# Patient Record
Sex: FEMALE | Race: WHITE | Employment: FULL TIME | URBAN - METROPOLITAN AREA
[De-identification: names, ages, dates, MRNs, and addresses within clinical notes are randomized per-mention and may not be internally consistent; named-entity substitution may affect disease eponyms.]

---

## 2017-04-14 ENCOUNTER — HOSPITAL ENCOUNTER (EMERGENCY)
Age: 47
Discharge: STILL ADMITTED OR ADMINISTRATIVE | End: 2017-04-14

## 2017-04-14 VITALS
OXYGEN SATURATION: 98 % | TEMPERATURE: 97.4 F | HEART RATE: 64 BPM | SYSTOLIC BLOOD PRESSURE: 125 MMHG | RESPIRATION RATE: 18 BRPM | DIASTOLIC BLOOD PRESSURE: 95 MMHG

## 2017-04-14 PROCEDURE — 4500000002 HC ER NO CHARGE

## 2019-03-15 ENCOUNTER — APPOINTMENT (OUTPATIENT)
Dept: CT IMAGING | Age: 49
End: 2019-03-15
Payer: COMMERCIAL

## 2019-03-15 ENCOUNTER — HOSPITAL ENCOUNTER (EMERGENCY)
Age: 49
Discharge: HOME | End: 2019-03-15
Payer: COMMERCIAL

## 2019-03-15 VITALS
BODY MASS INDEX: 46.61 KG/M2 | HEIGHT: 64 IN | OXYGEN SATURATION: 98 % | HEART RATE: 88 BPM | DIASTOLIC BLOOD PRESSURE: 81 MMHG | RESPIRATION RATE: 16 BRPM | WEIGHT: 273 LBS | SYSTOLIC BLOOD PRESSURE: 152 MMHG | TEMPERATURE: 98.3 F

## 2019-03-15 DIAGNOSIS — B34.9 VIRAL ILLNESS: Primary | ICD-10-CM

## 2019-03-15 DIAGNOSIS — R19.7 DIARRHEA, UNSPECIFIED TYPE: ICD-10-CM

## 2019-03-15 DIAGNOSIS — F41.1 ANXIETY STATE: ICD-10-CM

## 2019-03-15 LAB
A/G RATIO: 1.2 (ref 1.1–2.2)
ALBUMIN SERPL-MCNC: 4 G/DL (ref 3.4–5)
ALP BLD-CCNC: 96 U/L (ref 40–129)
ALT SERPL-CCNC: 27 U/L (ref 10–40)
ANION GAP SERPL CALCULATED.3IONS-SCNC: 10 MMOL/L (ref 3–16)
AST SERPL-CCNC: 31 U/L (ref 15–37)
BASOPHILS ABSOLUTE: 0 K/UL (ref 0–0.2)
BASOPHILS RELATIVE PERCENT: 0.8 %
BILIRUB SERPL-MCNC: <0.2 MG/DL (ref 0–1)
BILIRUBIN URINE: NEGATIVE
BLOOD, URINE: NEGATIVE
BUN BLDV-MCNC: 12 MG/DL (ref 7–20)
CALCIUM SERPL-MCNC: 9.1 MG/DL (ref 8.3–10.6)
CHLORIDE BLD-SCNC: 100 MMOL/L (ref 99–110)
CLARITY: CLEAR
CO2: 27 MMOL/L (ref 21–32)
COLOR: YELLOW
CREAT SERPL-MCNC: 0.8 MG/DL (ref 0.6–1.1)
EOSINOPHILS ABSOLUTE: 0.2 K/UL (ref 0–0.6)
EOSINOPHILS RELATIVE PERCENT: 4.2 %
GFR AFRICAN AMERICAN: >60
GFR NON-AFRICAN AMERICAN: >60
GLOBULIN: 3.3 G/DL
GLUCOSE BLD-MCNC: 93 MG/DL (ref 70–99)
GLUCOSE URINE: NEGATIVE MG/DL
HCT VFR BLD CALC: 40.8 % (ref 36–48)
HEMOGLOBIN: 13.6 G/DL (ref 12–16)
KETONES, URINE: NEGATIVE MG/DL
LEUKOCYTE ESTERASE, URINE: NEGATIVE
LYMPHOCYTES ABSOLUTE: 2 K/UL (ref 1–5.1)
LYMPHOCYTES RELATIVE PERCENT: 35.5 %
MCH RBC QN AUTO: 28.8 PG (ref 26–34)
MCHC RBC AUTO-ENTMCNC: 33.3 G/DL (ref 31–36)
MCV RBC AUTO: 86.4 FL (ref 80–100)
MICROSCOPIC EXAMINATION: NORMAL
MONOCYTES ABSOLUTE: 0.7 K/UL (ref 0–1.3)
MONOCYTES RELATIVE PERCENT: 12.2 %
NEUTROPHILS ABSOLUTE: 2.7 K/UL (ref 1.7–7.7)
NEUTROPHILS RELATIVE PERCENT: 47.3 %
NITRITE, URINE: NEGATIVE
PDW BLD-RTO: 13.8 % (ref 12.4–15.4)
PH UA: 6 (ref 5–8)
PLATELET # BLD: 255 K/UL (ref 135–450)
PMV BLD AUTO: 7.7 FL (ref 5–10.5)
POTASSIUM SERPL-SCNC: 3.8 MMOL/L (ref 3.5–5.1)
PROTEIN UA: NEGATIVE MG/DL
RBC # BLD: 4.72 M/UL (ref 4–5.2)
SODIUM BLD-SCNC: 137 MMOL/L (ref 136–145)
SPECIFIC GRAVITY UA: <=1.005 (ref 1–1.03)
TOTAL PROTEIN: 7.3 G/DL (ref 6.4–8.2)
URINE TYPE: NORMAL
UROBILINOGEN, URINE: 0.2 E.U./DL
WBC # BLD: 5.6 K/UL (ref 4–11)

## 2019-03-15 PROCEDURE — 2580000003 HC RX 258: Performed by: PHYSICIAN ASSISTANT

## 2019-03-15 PROCEDURE — 96361 HYDRATE IV INFUSION ADD-ON: CPT

## 2019-03-15 PROCEDURE — 96374 THER/PROPH/DIAG INJ IV PUSH: CPT

## 2019-03-15 PROCEDURE — 6360000004 HC RX CONTRAST MEDICATION: Performed by: PHYSICIAN ASSISTANT

## 2019-03-15 PROCEDURE — 81003 URINALYSIS AUTO W/O SCOPE: CPT

## 2019-03-15 PROCEDURE — 85025 COMPLETE CBC W/AUTO DIFF WBC: CPT

## 2019-03-15 PROCEDURE — 6360000002 HC RX W HCPCS: Performed by: PHYSICIAN ASSISTANT

## 2019-03-15 PROCEDURE — 80053 COMPREHEN METABOLIC PANEL: CPT

## 2019-03-15 PROCEDURE — 99284 EMERGENCY DEPT VISIT MOD MDM: CPT

## 2019-03-15 RX ORDER — DIPHENOXYLATE HYDROCHLORIDE AND ATROPINE SULFATE 2.5; .025 MG/1; MG/1
1 TABLET ORAL 4 TIMES DAILY PRN
Qty: 20 TABLET | Refills: 0 | Status: SHIPPED | OUTPATIENT
Start: 2019-03-15 | End: 2019-03-25

## 2019-03-15 RX ORDER — 0.9 % SODIUM CHLORIDE 0.9 %
1000 INTRAVENOUS SOLUTION INTRAVENOUS ONCE
Status: COMPLETED | OUTPATIENT
Start: 2019-03-15 | End: 2019-03-15

## 2019-03-15 RX ORDER — PROMETHAZINE HYDROCHLORIDE 25 MG/1
25 TABLET ORAL EVERY 6 HOURS PRN
Qty: 20 TABLET | Refills: 0 | Status: SHIPPED | OUTPATIENT
Start: 2019-03-15 | End: 2019-03-22

## 2019-03-15 RX ORDER — DICYCLOMINE HYDROCHLORIDE 10 MG/1
10 CAPSULE ORAL
Qty: 30 CAPSULE | Refills: 0 | Status: SHIPPED | OUTPATIENT
Start: 2019-03-15 | End: 2019-08-04

## 2019-03-15 RX ORDER — ONDANSETRON 2 MG/ML
4 INJECTION INTRAMUSCULAR; INTRAVENOUS ONCE
Status: COMPLETED | OUTPATIENT
Start: 2019-03-15 | End: 2019-03-15

## 2019-03-15 RX ADMIN — SODIUM CHLORIDE 1000 ML: 9 INJECTION, SOLUTION INTRAVENOUS at 14:46

## 2019-03-15 RX ADMIN — ONDANSETRON 4 MG: 2 INJECTION INTRAMUSCULAR; INTRAVENOUS at 14:46

## 2019-03-15 ASSESSMENT — PAIN DESCRIPTION - ORIENTATION: ORIENTATION: LOWER

## 2019-03-15 ASSESSMENT — PAIN DESCRIPTION - LOCATION: LOCATION: ABDOMEN

## 2019-03-15 ASSESSMENT — PAIN SCALES - GENERAL: PAINLEVEL_OUTOF10: 10

## 2019-03-18 ENCOUNTER — HOSPITAL ENCOUNTER (OUTPATIENT)
Age: 49
Setting detail: SPECIMEN
Discharge: HOME | End: 2019-03-18
Payer: COMMERCIAL

## 2019-03-18 PROCEDURE — 87328 CRYPTOSPORIDIUM AG IA: CPT

## 2019-03-18 PROCEDURE — 87425 ROTAVIRUS AG IA: CPT

## 2019-03-18 PROCEDURE — 87329 GIARDIA AG IA: CPT

## 2019-03-18 PROCEDURE — 87336 ENTAMOEB HIST DISPR AG IA: CPT

## 2019-03-19 LAB
CRYPTOSPORIDIUM ANTIGEN STOOL: NORMAL
E HISTOLYTICA ANTIGEN STOOL: NORMAL
GIARDIA ANTIGEN STOOL: NORMAL

## 2019-03-21 LAB — ROTAVIRUS ANTIGEN: NEGATIVE

## 2019-04-17 ENCOUNTER — HOSPITAL ENCOUNTER (OUTPATIENT)
Dept: NEUROLOGY | Age: 49
Discharge: HOME | End: 2019-04-17
Payer: COMMERCIAL

## 2019-04-17 PROCEDURE — 95886 MUSC TEST DONE W/N TEST COMP: CPT

## 2019-04-17 PROCEDURE — 95909 NRV CNDJ TST 5-6 STUDIES: CPT

## 2019-04-17 NOTE — PROCEDURES
1.2 52 (n>40) 49 (n>40)   H-Reflex                 Summary of EMG and Nerve Conduction Findings: The above EMG needle exam was within normal limits. Nerve conduction studies were essentially within normal limits. Tibial motor evoked responses are borderline small. Remainder within normal limits. Overall Impression: Essentially normal study, small amplitude tibial motor response can be associated with early tarsal tunnel syndrome. No evidence of an acute radiculopathy or other entrapment neuropathy. Thank you. Electronically signed by:  Casandra Ness DO,4/17/2019,7:54 AM

## 2019-08-04 ENCOUNTER — HOSPITAL ENCOUNTER (EMERGENCY)
Age: 49
Discharge: HOME | End: 2019-08-04

## 2019-08-04 ENCOUNTER — APPOINTMENT (OUTPATIENT)
Dept: GENERAL RADIOLOGY | Age: 49
End: 2019-08-04

## 2019-08-04 VITALS
TEMPERATURE: 97.5 F | BODY MASS INDEX: 45.24 KG/M2 | OXYGEN SATURATION: 98 % | DIASTOLIC BLOOD PRESSURE: 85 MMHG | HEART RATE: 62 BPM | RESPIRATION RATE: 18 BRPM | WEIGHT: 265 LBS | SYSTOLIC BLOOD PRESSURE: 174 MMHG | HEIGHT: 64 IN

## 2019-08-04 DIAGNOSIS — S61.211A LACERATION OF LEFT INDEX FINGER WITHOUT FOREIGN BODY WITHOUT DAMAGE TO NAIL, INITIAL ENCOUNTER: Primary | ICD-10-CM

## 2019-08-04 PROCEDURE — 99282 EMERGENCY DEPT VISIT SF MDM: CPT

## 2019-08-04 PROCEDURE — 73140 X-RAY EXAM OF FINGER(S): CPT

## 2019-08-04 PROCEDURE — 6370000000 HC RX 637 (ALT 250 FOR IP): Performed by: NURSE PRACTITIONER

## 2019-08-04 RX ORDER — NAPROXEN 500 MG/1
500 TABLET ORAL ONCE
Status: COMPLETED | OUTPATIENT
Start: 2019-08-04 | End: 2019-08-04

## 2019-08-04 RX ADMIN — NAPROXEN 500 MG: 500 TABLET ORAL at 22:42

## 2019-08-04 ASSESSMENT — PAIN SCALES - GENERAL
PAINLEVEL_OUTOF10: 3
PAINLEVEL_OUTOF10: 3

## 2019-08-04 ASSESSMENT — PAIN DESCRIPTION - LOCATION: LOCATION: FINGER (COMMENT WHICH ONE)

## 2019-08-04 ASSESSMENT — PAIN DESCRIPTION - DESCRIPTORS: DESCRIPTORS: THROBBING

## 2019-08-04 ASSESSMENT — PAIN DESCRIPTION - FREQUENCY: FREQUENCY: CONTINUOUS

## 2019-08-05 NOTE — ED PROVIDER NOTES
[address] ED  EMERGENCY DEPARTMENT ENCOUNTER        Pt Name: Cortes Tobin  MRN: [id number]  [place] 1970  Date of evaluation: 8/4/2019  Provider: HERON Avelar - CNP  PCP: Stevo Clark MD    This patient was not seen and evaluated by the attending physician No att. providers found. CHIEF COMPLAINT       Chief Complaint   Patient presents with    Laceration     pt arrives to T3 c/o laceration to left hand 2nd digit, pt states happeed around midnight last night, pt states was at wedding so put butterfly to it and finsihed night, pt states still having throbbing in area, no active bleeding noted       HISTORY OF PRESENT ILLNESS   (Location/Symptom, Timing/Onset, Context/Setting, Quality, Duration, Modifying Factors, Severity)  Note limiting factors. Cotres Tobin is a 52 y.o. female who presents the emergency department with complaints of a laceration to her left index finger. Patient is right-hand dominant. Patient reports that last night around midnight she was at a wedding and using a pocket knife to cut a hole in a piece of fabric. She states that the knife slipped and accidentally lacerated her left index finger. 1 of her family members did bandage her with butterfly sutures. She comes to the emergency department tonight for evaluation of the wound. Her tetanus is up-to-date. She reports the pain 3 out of 10. Nursing Notes were all reviewed and agreed with or any disagreements were addressed  in the HPI. REVIEW OF SYSTEMS    (2-9 systems for level 4, 10 or more for level 5)     Review of Systems   Constitutional: Negative for chills and fever. Musculoskeletal: Positive for arthralgias. Skin: Positive for wound. Neurological: Negative for weakness and numbness. All other systems reviewed and are negative. Positives and Pertinent negatives as per HPI. Except as noted abovein the ROS, all other systems were reviewed and negative.

## 2020-07-23 ENCOUNTER — HOSPITAL ENCOUNTER (OUTPATIENT)
Age: 50
Setting detail: SPECIMEN
Discharge: HOME | End: 2020-07-23
Payer: COMMERCIAL

## 2020-07-23 PROCEDURE — 81371 HLA I & II TYPE VERIFY LR: CPT

## 2021-10-26 ENCOUNTER — TELEPHONE (OUTPATIENT)
Dept: MAMMOGRAPHY | Age: 51
End: 2021-10-26

## 2021-10-26 ENCOUNTER — HOSPITAL ENCOUNTER (OUTPATIENT)
Dept: MAMMOGRAPHY | Age: 51
Discharge: HOME | End: 2021-10-26
Payer: COMMERCIAL

## 2021-10-26 DIAGNOSIS — Z12.39 SCREENING BREAST EXAMINATION: ICD-10-CM

## 2021-10-26 PROCEDURE — 77063 BREAST TOMOSYNTHESIS BI: CPT

## 2021-11-01 ENCOUNTER — HOSPITAL ENCOUNTER (OUTPATIENT)
Dept: MAMMOGRAPHY | Age: 51
Discharge: HOME | End: 2021-11-01
Payer: COMMERCIAL

## 2021-11-01 ENCOUNTER — HOSPITAL ENCOUNTER (OUTPATIENT)
Dept: ULTRASOUND IMAGING | Age: 51
Discharge: HOME | End: 2021-11-01
Payer: COMMERCIAL

## 2021-11-01 DIAGNOSIS — R92.8 ABNORMAL MAMMOGRAM: ICD-10-CM

## 2021-11-01 PROCEDURE — G0279 TOMOSYNTHESIS, MAMMO: HCPCS

## 2021-11-01 PROCEDURE — 76642 ULTRASOUND BREAST LIMITED: CPT

## 2022-08-05 ENCOUNTER — HOSPITAL ENCOUNTER (OUTPATIENT)
Dept: GENERAL RADIOLOGY | Age: 52
Discharge: HOME | End: 2022-08-05
Payer: COMMERCIAL

## 2022-08-05 ENCOUNTER — HOSPITAL ENCOUNTER (OUTPATIENT)
Age: 52
Discharge: HOME | End: 2022-08-05
Payer: COMMERCIAL

## 2022-08-05 DIAGNOSIS — R11.10 RECURRENT VOMITING: ICD-10-CM

## 2022-08-05 PROCEDURE — 74018 RADEX ABDOMEN 1 VIEW: CPT

## 2022-08-09 ENCOUNTER — TELEPHONE (OUTPATIENT)
Dept: BARIATRICS/WEIGHT MGMT | Age: 52
End: 2022-08-09

## 2022-08-09 NOTE — TELEPHONE ENCOUNTER
Pt says she has been signed up for seminar a couple of times, has sent her insurance info a couple of times and hasnt been able to move past sending in her info, says atp she wanted to speak to a manager    We did see that pt signed up for the seminar and that the seminar was sent out on 2 different occasions, but we have no record of pt sending us insurance info back neither time

## 2022-08-11 ENCOUNTER — TELEPHONE (OUTPATIENT)
Dept: BARIATRICS/WEIGHT MGMT | Age: 52
End: 2022-08-11

## 2022-08-11 NOTE — TELEPHONE ENCOUNTER
Seminar Date: may     Presenter: emily    Insurance Type: medben    BWLS Covered:y    Medically Supervised Diet Req:n    Length of time:     Has patient had prev bariatric or gastric surgeries : n    Is patient's BMI lower than 35 :   n  If yes, BMI :    Does patient have dx of diabetes :n    *If previous bariatric or gastric surgeries, please do not schedule until speaking with the provider*

## 2022-09-22 ENCOUNTER — OFFICE VISIT (OUTPATIENT)
Dept: BARIATRICS/WEIGHT MGMT | Age: 52
End: 2022-09-22
Payer: COMMERCIAL

## 2022-09-22 VITALS
RESPIRATION RATE: 18 BRPM | SYSTOLIC BLOOD PRESSURE: 126 MMHG | DIASTOLIC BLOOD PRESSURE: 72 MMHG | WEIGHT: 293 LBS | OXYGEN SATURATION: 98 % | BODY MASS INDEX: 50.02 KG/M2 | HEIGHT: 64 IN | HEART RATE: 83 BPM

## 2022-09-22 DIAGNOSIS — E66.01 MORBID OBESITY WITH BMI OF 50.0-59.9, ADULT (HCC): Primary | ICD-10-CM

## 2022-09-22 DIAGNOSIS — K21.9 CHRONIC GERD: ICD-10-CM

## 2022-09-22 DIAGNOSIS — I10 ESSENTIAL HYPERTENSION: ICD-10-CM

## 2022-09-22 DIAGNOSIS — R73.03 PREDIABETES: ICD-10-CM

## 2022-09-22 DIAGNOSIS — Z01.818 PREOPERATIVE CLEARANCE: ICD-10-CM

## 2022-09-22 PROCEDURE — 99205 OFFICE O/P NEW HI 60 MIN: CPT | Performed by: SURGERY

## 2022-09-22 RX ORDER — CYANOCOBALAMIN (VITAMIN B-12) 500 MCG
LOZENGE ORAL
COMMUNITY

## 2022-09-22 RX ORDER — OMEPRAZOLE 40 MG/1
CAPSULE, DELAYED RELEASE ORAL
COMMUNITY
Start: 2022-08-05

## 2022-09-22 RX ORDER — VALSARTAN 160 MG/1
TABLET ORAL
COMMUNITY
Start: 2022-08-05

## 2022-09-22 NOTE — Clinical Note
Greatly appreciate the referral.  Excellent candidate for weight loss. We will keep you posted on [place] progress.

## 2022-09-22 NOTE — PROGRESS NOTES
Zenobia Spencer is a 46 y.o. female with a date of birth of 1970. Vitals:    09/22/22 0847   BP: 126/72   Pulse: 83   Resp: 18   SpO2: 98%   BMI: Body mass index is 55.65 kg/m². Obesity Classification: Class III    Weight History: Wt Readings from Last 3 Encounters:   09/22/22 (!) 324 lb 3.2 oz (147.1 kg)   08/04/19 265 lb (120.2 kg)   03/15/19 273 lb (123.8 kg)     Patient's lowest adult weight was 168 lbs at age 29. Patient's highest adult weight was 324 lbs at age 46. Patient has participated in the following weight loss programs: [facility], [facility], , LF, [place], [facility], [facility] and [facility]. Patient has participated in meal replacement/liquid diets. Patient has participated in weight loss medications- thrive, lipozene, garcinia cambogia & apple cider vinegar. Patient is not lactose intolerant. Patient does not have Restorationist/cultural food concerns. Patient does have food allergies- Artifical Grape Flavoring / COCONUT. Patient does tolerate artificial sweeteners. 24 hour recall/food frequency chart:  Breakfast: yes. aaple & brown sugar oatmeal  Snack: no.   Lunch: yes. grilled cheese & slade  Snack: no.   Dinner: yes. tacos- beef, tomato, lettuce, cheese, sour cream & onion  Snack: sometimes tomatoes OR grapes  Drinks throughout the day: water / occasional logan mist / coffee w/ [place]awk vanilla creamer / gatorade zero / diet tea  Do you drink alcohol? Yes. - occasional mixed drink, maybe 1x/year    Patient does not meet the criteria for binge eating disorder. Patient does have grazing. Patient does not have night eating. Patient does have a history of emotional eating or eating out of boredom. Surgery  Patient does feel confident in her ability to make these changes. The patient's expectations of post-surgical eating habits - voices understanding. Patient states she does understand the consequences of not complying with post-op food guidelines. Patient states she does understands the long term changes in food intake that will be necessary for all occasions after surgery for the rest of her life. Patient is deemed nutritionally appropriate to proceed. Goals  Weight: at least 175 lb  Health Improvement: BP / decrease meds / HgbA1C    Assessment  Nutritional Needs: RMR=(9.99 x 120) + (6.25 x 163) - (4.92 x 52 y.o.) -161 = 1801 kcal x 1.3 (sedentary activity factor) = 2341 kcal - 1000 (for 2 lb weight loss/week)= 1341 kcal.  *walk sit-up & leg lifts at least 3x/wk    Plan  Plan/Recommendations: Start presurgical guidelines. Goals:   -Eat 4-5 times daily  -Avoid high fat and high sugar foods  -Include protein with all meals and snacks  -Avoid carbonation and caffeine  -Avoid calorie containing beverages  -Increase physical activity as tolerated    PES Statement:  Overweight/Obesity related to lack of exercise, sedentary lifestyle, unhealthy eating habits, and unsuccessful diet attempts as evidenced by BMI. Body mass index is 55.65 kg/m². Will follow up as necessary.     Taj Fan, RD, LD

## 2022-09-22 NOTE — PATIENT INSTRUCTIONS
Patient received dietary handouts and education.        -Plan for Laparoscopic sleeve gastrectomy      Pre-operative work up Ordered:    - F/U in 4 weeks. - Nutrition Labs. - Protein Shake Trial.  - Psych Evaluation.   - Cardiac Clearance. - Pulmonary Clearance. - EGD (endoscopy to check your stomach). - Support Group Attendance. - Obtain letter of medical necessity (PCP Letter). - Quit Smoking,  Alcohol, Caffeine and Carbonated Drinks  - Obtain records for Weight History 2 yrs. - Start Regular Exercise and track your activities. - Start Tracking your food Intake and follow dietary guidelines. - Avoid Pregnancy for 2 yrs from date of surgery. (for female patients in childbearing age)          Patient advised that its their responsibility to follow up for studies, referrals and/or labs ordered today.

## 2022-09-23 NOTE — PROGRESS NOTES
[facility]) Physicians   [employer] [facility] [employer]  Sanam Woodall MD, [facility], [address], [address] [facility] [address] . Phone: [phone]  Fax: [phone]       Chief Complaint   Patient presents with    Bariatric, Initial Visit     NP Desiree Goodman           HPI:    Ferdinand Velez is a very pleasant 46 y.o. obese female ,   Body mass index is 55.65 kg/m². And multiple medical problems who is presenting for weight loss surgery evaluation and consultation by Dr. Isael Parker. Patient has been struggling for several years now with obesity. Patient feels the weight is an obstacle to achieve and perform things in daily living as well risk on health. Tries to diet, and exercise but can't keep the weight off. Patient tried [facility] [employer] [facility], [employer], , LF, [place], [facility], [facility] and [facility]. Patient has participated in meal replacement/liquid diets. Patient has participated in weight loss medications- thrive, lipozene, garcinia cambogia & apple cider vinegar and other regimens, but with no sustainable weight loss. Patient  is very determined to lose weight and be healthy, and is interested in surgical weight loss for future weight loss. .    Otherwise patient denies any nausea, vomiting, fevers, chills, shortness of breath, chest pain, constipation or urinary symptoms.         Obesity related problems Analia Chaney is dealing with:  Patient Active Problem List   Diagnosis    Chest pain    Menorrhagia    Essential hypertension    Preoperative clearance    Chronic GERD    Morbid obesity with BMI of 50.0-59.9, adult ([address]    Prediabetes           Pain Assessment   Denies any abdominal pain     Past Medical History:   Diagnosis Date    Acid reflux     Hypertension     MVP (mitral valve prolapse)      Past Surgical History:   Procedure Laterality Date    ADENOIDECTOMY      CHOLECYSTECTOMY      ENDOMETRIAL ABLATION  11/22/2013    DILATATION AND CURETTAGE HYSTEROSCOPY WITH NOVASURE ABLATION    OVARIAN CYST REMOVAL      TONSILLECTOMY      TUBAL LIGATION       Family History   Problem Relation Age of Onset    Heart Disease Mother     Cancer Mother     Heart Disease Father     Cancer Father     Cancer Sister      Social History     Tobacco Use    Smoking status: Never    Smokeless tobacco: Never   Substance Use Topics    Alcohol use: No         I counseled the patient on the risks of Smoking, ETOH or Drug use, and importance of completely avoiding them, otherwise patient risks surgery cancellation or post operative serious complications if they start using any. Maddi Kenny acknowledged, agreed not to use and wants to proceed. Allergies   Allergen Reactions    Erythromycin Nausea And Vomiting     Vitals:    09/22/22 0847   BP: 126/72   Pulse: 83   Resp: 18   SpO2: 98%   Weight: (!) 324 lb 3.2 oz (147.1 kg)   Height: 5' 4\" (1.626 m)       Body mass index is 55.65 kg/m².       Current Outpatient Medications:     valsartan (DIOVAN) 160 MG tablet, TAKE 1 TABLET BY MOUTH EVERY DAY, Disp: , Rfl:     omeprazole (PRILOSEC) 40 MG delayed release capsule, TAKE 1 CAPSULE BY MOUTH EVERY DAY BEFORE A MEAL, Disp: , Rfl:     Vitamin E 400 units TABS, Take by mouth, Disp: , Rfl:     venlafaxine (EFFEXOR) 37.5 MG tablet, Take 37.5 mg by mouth daily, Disp: , Rfl:     diltiazem (CARDIZEM CD) 120 MG extended release capsule, Take 2 capsules by mouth daily, Disp: 30 capsule, Rfl: 0    loratadine (CLARITIN) 10 MG capsule, Take  by mouth., Disp: , Rfl:       Review of Systems - History obtained from the patient  General ROS: overweight   Psychological ROS: negative  Ophthalmic ROS: negative  Neurological ROS: negative  ENT ROS: negative  Allergy and Immunology ROS: negative  Hematological and Lymphatic ROS: negative  Endocrine ROS: overweight  Breast ROS: negative  Respiratory ROS: negative  Cardiovascular ROS: negative  Gastrointestinal ROS:negative  Genito-Urinary ROS: negative  Musculoskeletal ROS: weight effects on joints  Skin ROS: negative    Physical Exam   Constitutional: Patient is oriented to person, place, and time. Vital signs are normal. Patient  appears well-developed and well-nourished. Patient  is active and cooperative. Non-toxic appearance. No distress. HENT:   Head: Normocephalic and atraumatic. Head is without laceration. Right Ear: External ear normal. No lacerations. No drainage, swelling or tenderness. Left Ear: External ear normal. No lacerations. No drainage, swelling or tenderness. Nose/Mouth/Throat: Patient is wearing mask due to Covid-19 pandemic precautions, following CDC and health authorities guidelines. Eyes: Conjunctivae, EOM and lids are normal. Pupils are equal, round, and reactive to light. Right eye exhibits no discharge. No foreign body present in the right eye. Left eye exhibits no discharge. No foreign body present in the left eye. No scleral icterus. Neck: Trachea normal and normal range of motion. Neck supple. No JVD present. No tracheal tenderness present. Carotid bruit is not present. No rigidity. No tracheal deviation and no edema present. No thyromegaly present. Cardiovascular: Normal rate, regular rhythm, normal heart sounds, intact distal pulses and normal pulses. Pulmonary/Chest: Effort normal and breath sounds normal. No stridor. No respiratory distress. Patient  has no wheezes. Patient has no rales. Patient exhibits no tenderness and no crepitus. Abdominal: Soft. Normal appearance and bowel sounds are normal. Patient exhibits no distension, no abdominal bruit, no ascites and no mass. There is no hepatosplenomegaly. There is no tenderness. There is no rigidity, no rebound, no guarding and no CVA tenderness. No hernia. Hernia confirmed negative in the ventral area. Musculoskeletal: Normal range of motion. Patient exhibits no edema or tenderness.    Lymphadenopathy:        Head (right side): No submental, no submandibular, no preauricular, no posterior auricular and no occipital adenopathy present. Head (left side): No submental, no submandibular, no preauricular, no posterior auricular and no occipital adenopathy present. Patient  has no cervical adenopathy. Right: No supraclavicular adenopathy present. Left: No supraclavicular adenopathy present. Neurological: Patient is alert and oriented to person, place, and time. Patient has normal strength. Coordination and gait normal. GCS eye subscore is 4. GCS verbal subscore is 5. GCS motor subscore is 6. Skin: Skin is warm and dry. No abrasion and no rash noted. Patient  is not diaphoretic. No cyanosis or erythema. Psychiatric: Patient has a normal mood and affect. speech is normal and behavior is normal. Cognition and memory are normal.         Anuj Huff was seen today for bariatric, initial visit. Diagnoses and all orders for this visit:    Morbid obesity with BMI of 50.0-59.9, adult ([facility] [address] [facility]  -     CBC with Auto Differential; Future  -     Comprehensive Metabolic Panel; Future  -     Hemoglobin A1C; Future  -     Iron and TIBC; Future  -     Lipid Panel; Future  -     TSH with Reflex; Future  -     Vitamin A; Future  -     Vitamin B1, Whole Blood; Future  -     Vitamin B12 & Folate; Future  -     Vitamin D 25 Hydroxy; Future  -     Vitamin E; Future  -     Protime-INR; Future  -     Ambulatory referral to Cardiology  -     [facility]    Essential hypertension  -     CBC with Auto Differential; Future  -     Comprehensive Metabolic Panel; Future  -     Hemoglobin A1C; Future  -     Iron and TIBC; Future  -     Lipid Panel; Future  -     TSH with Reflex; Future  -     Vitamin A; Future  -     Vitamin B1, Whole Blood; Future  -     Vitamin B12 & Folate; Future  -     Vitamin D 25 Hydroxy; Future  -     Vitamin E; Future  -     Protime-INR;  Future  -     Ambulatory referral to Cardiology  -     [facility] - Danette Hammonds MD, [facility]    Preoperative clearance  -     CBC with Auto Differential; Future  -     Comprehensive Metabolic Panel; Future  -     Hemoglobin A1C; Future  -     Iron and TIBC; Future  -     Lipid Panel; Future  -     TSH with Reflex; Future  -     Vitamin A; Future  -     Vitamin B1, Whole Blood; Future  -     Vitamin B12 & Folate; Future  -     Vitamin D 25 Hydroxy; Future  -     Vitamin E; Future  -     Protime-INR; Future  -     Ambulatory referral to Cardiology  Dana Palomino MD, [facility]    Chronic GERD  -     CBC with Auto Differential; Future  -     Comprehensive Metabolic Panel; Future  -     Hemoglobin A1C; Future  -     Iron and TIBC; Future  -     Lipid Panel; Future  -     TSH with Reflex; Future  -     Vitamin A; Future  -     Vitamin B1, Whole Blood; Future  -     Vitamin B12 & Folate; Future  -     Vitamin D 25 Hydroxy; Future  -     Vitamin E; Future  -     Protime-INR; Future  -     Ambulatory referral to Cardiology  Dana Palomino MD, [facility]    Prediabetes  -     CBC with Auto Differential; Future  -     Comprehensive Metabolic Panel; Future  -     Hemoglobin A1C; Future  -     Iron and TIBC; Future  -     Lipid Panel; Future  -     TSH with Reflex; Future  -     Vitamin A; Future  -     Vitamin B1, Whole Blood; Future  -     Vitamin B12 & Folate; Future  -     Vitamin D 25 Hydroxy; Future  -     Vitamin E; Future  -     Protime-INR; Future  -     Ambulatory referral to Cardiology  Dana Palomino MD, [facility]          A/P  Hanane Nielsen is a very pleasant 46 y.o. female with Obesity,  Body mass index is 55.65 kg/m². and multiple obesity related co-morbidities. Hanane Nielsen is very motivated to lose weight and being more healthy.      We discussed how her weight affects her overall health including:  Patient Active Problem List   Diagnosis    Chest pain    Menorrhagia Essential hypertension    Preoperative clearance    Chronic GERD    Morbid obesity with BMI of 50.0-59.9, adult ([facility] [address]    Prediabetes          The patient underwent extensive dietary counseling with the registered dietitian. I have reviewed, discussed and agree with the dietary plan. Medical weight loss and different surgical options were discussed in details with patient. Sukhjinder Lin is interested in surgical weight loss for future weight loss. Patient is interested in Laparoscopic Sleeve Gastrectomy, which I believe is an excellent option. I explained to the patient that surgery does carry a risk specially with the coexisting comorbid conditions the patient have. Surgery as well in obese patiens can carry more risk. Risks including but not limited to; Infection, bleeding, gastric leak or obstruction, persistent nausea, vomiting, or reflux, injury to surrounding structures, risks of anesthesia, stricture, delayed gastric emptying, staple line leak, incisional hernia, malnutrition , vitamin deficiency, neurological complications, paralysis, hair loss, and/ or Conversion to Open surgery may be necessary. Failure to lose or maintain weight loss, Gallstones or Kidney Stones, Deep Venous Thrombosis , pulmonary embolism and / or death. However I do believe the benefits outweighs that risk. Wichitasanjay Burden understands the risks and wants to proceed. We will proceed with pre-operative work up labs and studies. Will also petition patient's  insurance for approval for this procedure. I advised the patient that we can't guarantee final insurance approval.    Patient received dietary handouts and education. Patient advised that its their responsibility to follow up for studies, referrals and/or labs ordered today.    Also discussed in details the importance of follow up, as well following the recommendations and completing the whole program to improve outcomes when it comes to healthier lifestyle as well weight loss. Patient also advised about risks and benefits being on a strict dietary regimen as well using supplements. Patient agrees and wants to proceed with weight loss planning     Today's encounter included any number of the following: Bariatric Pre/Post operative work up/protocols, review of labs, imaging, provider notes, outside hospital records, performing examination/evaluation, counseling patient and/or family, ordering medications/tests, placing referrals and communication with referring physicians, coordination of care; discussing dietary plan/recall with the patient as well with registered dietitian and documentation in the EHR. Of note, the above was done during same day of the actual patient encounter. Obesity as a disease is considered a high risk to patients overall health and should therefore be considered a high risk disease state. Advised the patient that not getting there weight under control (weight loss hopefully will help with resolving/improving of the comorbid conditions),  that could increase risk of complications/worsening of those conditions on the long-term. Now with Covid-19 pandemic, CDC and health authorities does classify obese patients as vulnerable and high risk as well. Which makes weight loss a priority for improvement of their wellbeing and overall health. CDC has issued the following statement as far Obese patients being at Increased Risk of being critically ill from SARS-Cov-2  \"Severe obesity increases the risk of a serious breathing problem called acute respiratory distress syndrome (ARDS), which is a major complication of ZKLAI-59 and can cause difficulties with a doctors ability to provide respiratory support for seriously ill patients. People living with severe obesity can have multiple serious chronic diseases and underlying health conditions that can increase the risk of severe illness from COVID-19. \"      I did explain thoroughly to the patient that compliance with pre- and post op diet and other recommendations are integral part to improve the chances of successful weight loss and also not following it could end with serious health complications. Also stressed to the patient importance of taking the multivitamins as instructed, otherwise risk significant complications. Patient Instructions   Patient received dietary handouts and education.        -Plan for Laparoscopic sleeve gastrectomy      Pre-operative work up Ordered:    - F/U in 4 weeks. - Nutrition Labs. - Protein Shake Trial.  - Psych Evaluation.   - Cardiac Clearance. - Pulmonary Clearance. - EGD (endoscopy to check your stomach). - Support Group Attendance. - Obtain letter of medical necessity (PCP Letter). - Quit Smoking,  Alcohol, Caffeine and Carbonated Drinks  - Obtain records for Weight History 2 yrs. - Start Regular Exercise and track your activities. - Start Tracking your food Intake and follow dietary guidelines. - Avoid Pregnancy for 2 yrs from date of surgery. (for female patients in childbearing age)          Patient advised that its their responsibility to follow up for studies, referrals and/or labs ordered today. Please note that some or all of this report was generated using voice recognition software. Please notify me in case of any questions about the content of this document, as some errors in transcription may have occurred .

## 2022-10-06 ENCOUNTER — HOSPITAL ENCOUNTER (OUTPATIENT)
Age: 52
Discharge: HOME | End: 2022-10-06
Payer: COMMERCIAL

## 2022-10-06 DIAGNOSIS — R73.03 PREDIABETES: ICD-10-CM

## 2022-10-06 DIAGNOSIS — I10 ESSENTIAL HYPERTENSION: ICD-10-CM

## 2022-10-06 DIAGNOSIS — K21.9 CHRONIC GERD: ICD-10-CM

## 2022-10-06 DIAGNOSIS — Z01.818 PREOPERATIVE CLEARANCE: ICD-10-CM

## 2022-10-06 DIAGNOSIS — E66.01 MORBID OBESITY WITH BMI OF 50.0-59.9, ADULT (HCC): ICD-10-CM

## 2022-10-06 LAB
BASOPHILS ABSOLUTE: 0 K/UL (ref 0–0.2)
BASOPHILS RELATIVE PERCENT: 0.4 %
EOSINOPHILS ABSOLUTE: 0 K/UL (ref 0–0.6)
EOSINOPHILS RELATIVE PERCENT: 0 %
HCT VFR BLD CALC: 39.6 % (ref 36–48)
HEMOGLOBIN: 13.3 G/DL (ref 12–16)
INR BLD: 1.11 (ref 0.87–1.14)
LYMPHOCYTES ABSOLUTE: 2.6 K/UL (ref 1–5.1)
LYMPHOCYTES RELATIVE PERCENT: 34.8 %
MCH RBC QN AUTO: 28 PG (ref 26–34)
MCHC RBC AUTO-ENTMCNC: 33.5 G/DL (ref 31–36)
MCV RBC AUTO: 83.4 FL (ref 80–100)
MONOCYTES ABSOLUTE: 0.5 K/UL (ref 0–1.3)
MONOCYTES RELATIVE PERCENT: 6.2 %
NEUTROPHILS ABSOLUTE: 4.4 K/UL (ref 1.7–7.7)
NEUTROPHILS RELATIVE PERCENT: 58.6 %
PDW BLD-RTO: 14.4 % (ref 12.4–15.4)
PLATELET # BLD: 336 K/UL (ref 135–450)
PMV BLD AUTO: 6.9 FL (ref 5–10.5)
PROTHROMBIN TIME: 14.1 SEC (ref 11.7–14.5)
RBC # BLD: 4.75 M/UL (ref 4–5.2)
WBC # BLD: 7.4 K/UL (ref 4–11)

## 2022-10-06 PROCEDURE — 80061 LIPID PANEL: CPT

## 2022-10-06 PROCEDURE — 82746 ASSAY OF FOLIC ACID SERUM: CPT

## 2022-10-06 PROCEDURE — 80053 COMPREHEN METABOLIC PANEL: CPT

## 2022-10-06 PROCEDURE — 85610 PROTHROMBIN TIME: CPT

## 2022-10-06 PROCEDURE — 84443 ASSAY THYROID STIM HORMONE: CPT

## 2022-10-06 PROCEDURE — 82306 VITAMIN D 25 HYDROXY: CPT

## 2022-10-06 PROCEDURE — 83036 HEMOGLOBIN GLYCOSYLATED A1C: CPT

## 2022-10-06 PROCEDURE — 83540 ASSAY OF IRON: CPT

## 2022-10-06 PROCEDURE — 36415 COLL VENOUS BLD VENIPUNCTURE: CPT

## 2022-10-06 PROCEDURE — 85025 COMPLETE CBC W/AUTO DIFF WBC: CPT

## 2022-10-06 PROCEDURE — 83550 IRON BINDING TEST: CPT

## 2022-10-06 PROCEDURE — 84425 ASSAY OF VITAMIN B-1: CPT

## 2022-10-06 PROCEDURE — 84590 ASSAY OF VITAMIN A: CPT

## 2022-10-06 PROCEDURE — 82607 VITAMIN B-12: CPT

## 2022-10-06 PROCEDURE — 84446 ASSAY OF VITAMIN E: CPT

## 2022-10-07 LAB
A/G RATIO: 1.6 (ref 1.1–2.2)
ALBUMIN SERPL-MCNC: 4.2 G/DL (ref 3.4–5)
ALP BLD-CCNC: 116 U/L (ref 40–129)
ALT SERPL-CCNC: 17 U/L (ref 10–40)
ANION GAP SERPL CALCULATED.3IONS-SCNC: 11 MMOL/L (ref 3–16)
AST SERPL-CCNC: 15 U/L (ref 15–37)
BILIRUB SERPL-MCNC: 0.3 MG/DL (ref 0–1)
BUN BLDV-MCNC: 16 MG/DL (ref 7–20)
CALCIUM SERPL-MCNC: 9.5 MG/DL (ref 8.3–10.6)
CHLORIDE BLD-SCNC: 102 MMOL/L (ref 99–110)
CHOLESTEROL, TOTAL: 219 MG/DL (ref 0–199)
CO2: 25 MMOL/L (ref 21–32)
CREAT SERPL-MCNC: 0.9 MG/DL (ref 0.6–1.1)
ESTIMATED AVERAGE GLUCOSE: 108.3 MG/DL
FOLATE: 5.51 NG/ML (ref 4.78–24.2)
GFR AFRICAN AMERICAN: >60
GFR NON-AFRICAN AMERICAN: >60
GLUCOSE BLD-MCNC: 104 MG/DL (ref 70–99)
HBA1C MFR BLD: 5.4 %
HDLC SERPL-MCNC: 39 MG/DL (ref 40–60)
IRON SATURATION: 27 % (ref 15–50)
IRON: 78 UG/DL (ref 37–145)
LDL CHOLESTEROL CALCULATED: 153 MG/DL
POTASSIUM SERPL-SCNC: 4.6 MMOL/L (ref 3.5–5.1)
SODIUM BLD-SCNC: 138 MMOL/L (ref 136–145)
TOTAL IRON BINDING CAPACITY: 287 UG/DL (ref 260–445)
TOTAL PROTEIN: 6.9 G/DL (ref 6.4–8.2)
TRIGL SERPL-MCNC: 134 MG/DL (ref 0–150)
TSH REFLEX: 2.44 UIU/ML (ref 0.27–4.2)
VITAMIN B-12: 499 PG/ML (ref 211–911)
VITAMIN D 25-HYDROXY: 34.7 NG/ML
VLDLC SERPL CALC-MCNC: 27 MG/DL

## 2022-10-09 LAB
ALPHA-TOCOPHEROL: 25 MG/L (ref 5.5–18)
GAMMA-TOCOPHEROL: 0.6 MG/L (ref 0–6)
RETINYL PALMITATE: 0.03 MG/L (ref 0–0.1)
VITAMIN A LEVEL: 0.41 MG/L (ref 0.3–1.2)
VITAMIN A, INTERP: NORMAL

## 2022-10-13 LAB — VITAMIN B1 WHOLE BLOOD: 146 NMOL/L (ref 70–180)

## 2022-10-14 ENCOUNTER — OFFICE VISIT (OUTPATIENT)
Dept: PULMONOLOGY | Age: 52
End: 2022-10-14
Payer: COMMERCIAL

## 2022-10-14 VITALS
SYSTOLIC BLOOD PRESSURE: 145 MMHG | BODY MASS INDEX: 34.15 KG/M2 | DIASTOLIC BLOOD PRESSURE: 91 MMHG | OXYGEN SATURATION: 98 % | TEMPERATURE: 98.5 F | HEIGHT: 64 IN | RESPIRATION RATE: 16 BRPM | HEART RATE: 84 BPM | WEIGHT: 200 LBS

## 2022-10-14 DIAGNOSIS — Z01.818 PREOPERATIVE CLEARANCE: ICD-10-CM

## 2022-10-14 DIAGNOSIS — G47.33 OSA (OBSTRUCTIVE SLEEP APNEA): ICD-10-CM

## 2022-10-14 DIAGNOSIS — G47.10 HYPERSOMNOLENCE: ICD-10-CM

## 2022-10-14 DIAGNOSIS — E66.9 CLASS 1 OBESITY WITH BODY MASS INDEX (BMI) OF 34.0 TO 34.9 IN ADULT, UNSPECIFIED OBESITY TYPE, UNSPECIFIED WHETHER SERIOUS COMORBIDITY PRESENT: ICD-10-CM

## 2022-10-14 DIAGNOSIS — R06.02 SOB (SHORTNESS OF BREATH): Primary | ICD-10-CM

## 2022-10-14 PROCEDURE — 99204 OFFICE O/P NEW MOD 45 MIN: CPT | Performed by: INTERNAL MEDICINE

## 2022-10-14 RX ORDER — ALBUTEROL SULFATE 90 UG/1
2 AEROSOL, METERED RESPIRATORY (INHALATION) 4 TIMES DAILY PRN
Qty: 54 G | Refills: 1 | Status: SHIPPED | OUTPATIENT
Start: 2022-10-14

## 2022-10-14 ASSESSMENT — ENCOUNTER SYMPTOMS
SPUTUM PRODUCTION: 0
ORTHOPNEA: 0
VOMITING: 0
WHEEZING: 1
SORE THROAT: 0
EYES NEGATIVE: 1
ALLERGIC/IMMUNOLOGIC NEGATIVE: 1
SHORTNESS OF BREATH: 1
RHINORRHEA: 0
SWOLLEN GLANDS: 0
GASTROINTESTINAL NEGATIVE: 1
ABDOMINAL PAIN: 0
HEMOPTYSIS: 0

## 2022-10-14 ASSESSMENT — SLEEP AND FATIGUE QUESTIONNAIRES
HOW LIKELY ARE YOU TO NOD OFF OR FALL ASLEEP IN A CAR, WHILE STOPPED FOR A FEW MINUTES IN TRAFFIC: 0
HOW LIKELY ARE YOU TO NOD OFF OR FALL ASLEEP WHILE SITTING QUIETLY AFTER LUNCH WITHOUT ALCOHOL: 0
HOW LIKELY ARE YOU TO NOD OFF OR FALL ASLEEP WHILE SITTING AND TALKING TO SOMEONE: 0
ESS TOTAL SCORE: 6
HOW LIKELY ARE YOU TO NOD OFF OR FALL ASLEEP WHILE SITTING INACTIVE IN A PUBLIC PLACE: 0
HOW LIKELY ARE YOU TO NOD OFF OR FALL ASLEEP WHILE LYING DOWN TO REST IN THE AFTERNOON WHEN CIRCUMSTANCES PERMIT: 0
HOW LIKELY ARE YOU TO NOD OFF OR FALL ASLEEP WHEN YOU ARE A PASSENGER IN A CAR FOR AN HOUR WITHOUT A BREAK: 0
NECK CIRCUMFERENCE (INCHES): 16.5
HOW LIKELY ARE YOU TO NOD OFF OR FALL ASLEEP WHILE SITTING AND READING: 3
HOW LIKELY ARE YOU TO NOD OFF OR FALL ASLEEP WHILE WATCHING TV: 3

## 2022-10-14 NOTE — PROGRESS NOTES
MA Communication:   The following orders are received by verbal communication from Hilaria Dacosta MD    Orders include:  PFT/MCT scheduled 11/18/22       Pt to call for follow up

## 2022-10-14 NOTE — PROGRESS NOTES
Sandy Granger (: 1970 ) is a 46 y.o. female here for an evaluation of   Chief Complaint   Patient presents with    Sleep Apnea     NPT for Bariatric Surgery         ASSESSMENT/PLAN:   Diagnosis Orders   1. SOB (shortness of breath)        2. ALEX (obstructive sleep apnea)        3. Preoperative clearance        4. Class 1 obesity with body mass index (BMI) of 34.0 to 34.9 in adult, unspecified obesity type, unspecified whether serious comorbidity present        5. Hypersomnolence                   I  RECOMMENDATIONS:     she will be scheduled for polysomnography in order to evaluate for the presence and severity of obstructive sleep apnea. He was given a discussion of the pathophysiology, evaluation and treatment of apnea. Thyroid function tests are recommended if not done recently. Advised to avoid driving when too sleepy to function safely and given a discussion of the risks of untreated apnea such as accidents, cognitive impairment, mood impairment, high blood pressure, various cardiac diseases and stroke. Weight loss was encouraged. Sleep Medicine 10/14/2022   Sitting and reading 3   Watching TV 3   Sitting, inactive in a public place (e.g. a theatre or a meeting) 0   As a passenger in a car for an hour without a break 0   Lying down to rest in the afternoon when circumstances permit 0   Sitting and talking to someone 0   Sitting quietly after a lunch without alcohol 0   In a car, while stopped for a few minutes in traffic 0   [place] Sleepiness Score 6   Neck circumference (Inches) 16.5           Neck circumference (Inches): 16.5        Will get Sleep study  I will call you with results        Shortness of breath  Will get  PFT and methacholine    Will give  Albuterol 2puffs as needed      RTC in 3 months      No follow-ups on file.        SUBJECTIVE/OBJECTIVE:    Consult from Dr. Calin Osborn  For pulmonary and sleep clearance for gastric sleeve surgery  Initially seen at 10/14/2022      Subjective:              46old year old,female, with PMH significant for htn and gerd,  that presents today for initial evaluation. Pt reports night sweats, insomnia for years  and that it is getting worse. Pt reports does snore moderate for years. Patient's partner does complain of pt snoring. Pt's partner does not notice that she stops breathing during sleep. Pt's  does wake   herself with headache, sweating, dry mouth, nocturia x2-3, leg cramps and toss and turn. Pt does report fatigue or tiredness frequently. Pt sleeps more than 7 hours, and at rare times overwhelming sleepiness attacks. Pt  does dozes unintentionally while watching TV. While driving  does not feel drowsy / nod off / fall sleep at stop lights. Pt does not have h/o sleepiness associated wrecks/near wrecks. Pt does  nod off while  unattended. Pt does report having restless legs 2-3 times a week. This is often accompanied by leg jerks during sleep, numbness in legs or feet, aches/burning/cramps in legs, feet. does report having nasal congestion. negative for use of nasal sprays, nose or sinus surgery. positive for tonsillectomy. Does not sleep with oxygen. Pt does not have a dental appliance or braces on teeth. does teeth grinding. Does not report nightmares, sleep walking, dreaming during naps. When angry or laughing [place] [facility] does not report cataplexy. she does not report hallucinations when dozing off or immediately upon awakening. Does not report sleep paralysis. Does not have parasomnia. Patient's [place] Sleepiness score  is required. Patient  is CONSISTENT with moderate daytime sleepiness. Having breathign issues  With flight of stair  This was worse and better since exercise    Some issues with fast pace    Non smoker      Fhx  Bro asthma  Sister and dtr with asthma          Shortness of Breath  This is a chronic problem. The current episode started more than 1 year ago.  The problem occurs intermittently. The problem has been waxing and waning. Associated symptoms include wheezing. Pertinent negatives include no abdominal pain, chest pain, claudication, coryza, ear pain, fever, headaches, hemoptysis, leg pain, leg swelling, neck pain, orthopnea, PND, rash, rhinorrhea, sore throat, sputum production, swollen glands, syncope or vomiting. Review of Systems   Constitutional: Negative. Negative for fever. HENT:  Negative for ear pain, rhinorrhea and sore throat. Eyes: Negative. Respiratory:  Positive for shortness of breath and wheezing. Negative for hemoptysis and sputum production. Cardiovascular: Negative. Negative for chest pain, orthopnea, claudication, leg swelling, syncope and PND. Gastrointestinal: Negative. Negative for abdominal pain and vomiting. Endocrine: Negative. Genitourinary: Negative. Musculoskeletal: Negative. Negative for neck pain. Skin: Negative. Negative for rash. Allergic/Immunologic: Negative. Neurological: Negative. Negative for headaches. Hematological: Negative. Psychiatric/Behavioral: Negative. Vitals:    10/14/22 0929 10/14/22 0949   BP: (!) 149/88 (!) 145/91   Site: Left Lower Arm Right Lower Arm   Position: Sitting Sitting   Cuff Size: Medium Adult Medium Adult   Pulse: 84    Resp: 16    Temp: 98.5 °F (36.9 °C)    TempSrc: Temporal    SpO2: 98%    Weight: 200 lb (90.7 kg)    Height: 5' 4\" (1.626 m)         Physical Exam  Vitals and nursing note reviewed. Constitutional:       General: She is not in acute distress. Appearance: Normal appearance. She is obese. She is not ill-appearing. HENT:      Head: Normocephalic and atraumatic. Right Ear: External ear normal.      Left Ear: External ear normal.      Nose: Nose normal.      Mouth/Throat:      Mouth: Mucous membranes are moist.      Pharynx: Oropharynx is clear. Comments: Mallampati 3  Eyes:      General: No scleral icterus.      Extraocular Movements: Extraocular movements intact. Conjunctiva/sclera: Conjunctivae normal.      Pupils: Pupils are equal, round, and reactive to light. Cardiovascular:      Rate and Rhythm: Normal rate and regular rhythm. Pulses: Normal pulses. Heart sounds: Normal heart sounds. No murmur heard. No friction rub. Pulmonary:      Effort: No respiratory distress. Breath sounds: No stridor. No wheezing, rhonchi or rales. Chest:      Chest wall: No tenderness. Abdominal:      General: Abdomen is flat. Bowel sounds are normal. There is no distension. Tenderness: There is no abdominal tenderness. There is no guarding. Musculoskeletal:         General: No swelling or tenderness. Normal range of motion. Cervical back: Normal range of motion and neck supple. No rigidity. Skin:     General: Skin is warm and dry. Coloration: Skin is not jaundiced. Neurological:      General: No focal deficit present. Mental Status: She is alert and oriented to person, place, and time. Mental status is at baseline. Cranial Nerves: No cranial nerve deficit. Sensory: No sensory deficit. Motor: No weakness. Gait: Gait normal.   Psychiatric:         Mood and Affect: Mood normal.         Thought Content:  Thought content normal.         Judgment: Judgment normal.            Christine Lawrence MD

## 2022-10-14 NOTE — PATIENT INSTRUCTIONS
ASSESSMENT/PLAN:   Diagnosis Orders   1. SOB (shortness of breath)        2. ALEX (obstructive sleep apnea)        3. Preoperative clearance        4. Class 1 obesity with body mass index (BMI) of 34.0 to 34.9 in adult, unspecified obesity type, unspecified whether serious comorbidity present        5. Hypersomnolence                   I  RECOMMENDATIONS:     she will be scheduled for polysomnography in order to evaluate for the presence and severity of obstructive sleep apnea. He was given a discussion of the pathophysiology, evaluation and treatment of apnea. Thyroid function tests are recommended if not done recently. Advised to avoid driving when too sleepy to function safely and given a discussion of the risks of untreated apnea such as accidents, cognitive impairment, mood impairment, high blood pressure, various cardiac diseases and stroke. Weight loss was encouraged. Sleep Medicine 10/14/2022   Sitting and reading 3   Watching TV 3   Sitting, inactive in a public place (e.g. a theatre or a meeting) 0   As a passenger in a car for an hour without a break 0   Lying down to rest in the afternoon when circumstances permit 0   Sitting and talking to someone 0   Sitting quietly after a lunch without alcohol 0   In a car, while stopped for a few minutes in traffic 0   [place] Sleepiness Score 6   Neck circumference (Inches) 16.5           Neck circumference (Inches): 16.5        Will get Sleep study  I will call you with results        Shortness of breath  Will get  PFT and methacholine    Will give  Albuterol 2puffs as needed      RTC in 3 months    Remember to bring a list of pulmonary medications and any CPAP or BiPAP machines to your next appointment with the office. Please keep all of your future appointments scheduled by [address], [facility].  Out of respect for other patients and providers, you may be asked to reschedule your appointment if you arrive later than your scheduled appointment time. Appointments cancelled less than 24hrs in advance will be considered a no show. Patients with three missed appointments within 1 year or four missed appointments within 2 years can be dismissed from the practice. Please be aware that our physicians are required to work in the Intensive Care Unit at [facility].  Your appointment may need to be rescheduled if they are designated to work during your appointment time. You may receive a survey regarding the care you received during your visit. Your input is valuable to us. We encourage you to complete and return your survey. We hope you will choose us in the future for your healthcare needs. Pt instructed of all future appointment dates & times, including radiology, labs, procedures & referrals. If procedures were scheduled preparation instructions provided. Instructions on future appointments with [facility] were given.

## 2022-10-14 NOTE — LETTER
[address] [facility]  [address] [facility] [address] [address]  Phone: [phone]  Fax: [phone]    Kallie Lopez MD        October 14, 2022     Patient: Candi Pineda   YOB: 1970   Date of Visit: 10/14/2022       10/14/22        Candi Pineda      I have seen this patient in the office today and wanted to communicate my findings and recommendations. Patient Instructions         ASSESSMENT/PLAN:   Diagnosis Orders   1. SOB (shortness of breath)        2. ALEX (obstructive sleep apnea)        3. Preoperative clearance        4. Class 1 obesity with body mass index (BMI) of 34.0 to 34.9 in adult, unspecified obesity type, unspecified whether serious comorbidity present        5. Hypersomnolence                   I  RECOMMENDATIONS:     she will be scheduled for polysomnography in order to evaluate for the presence and severity of obstructive sleep apnea. He was given a discussion of the pathophysiology, evaluation and treatment of apnea. Thyroid function tests are recommended if not done recently. Advised to avoid driving when too sleepy to function safely and given a discussion of the risks of untreated apnea such as accidents, cognitive impairment, mood impairment, high blood pressure, various cardiac diseases and stroke. Weight loss was encouraged.        Sleep Medicine 10/14/2022   Sitting and reading 3   Watching TV 3   Sitting, inactive in a public place (e.g. a theatre or a meeting) 0   As a passenger in a car for an hour without a break 0   Lying down to rest in the afternoon when circumstances permit 0   Sitting and talking to someone 0   Sitting quietly after a lunch without alcohol 0   In a car, while stopped for a few minutes in traffic 0   [place] Sleepiness Score 6   Neck circumference (Inches) 16.5           Neck circumference (Inches): 16.5        Will get Sleep study  I will call you with results        Shortness of breath  Will get  PFT and methacholine    Will give  Albuterol 2puffs as needed      RTC in 3 months                   Thank you for allowing me to assist in the care of the MD Zafar Gomez MD

## 2022-10-22 PROBLEM — Z01.818 PREOPERATIVE CLEARANCE: Status: RESOLVED | Noted: 2022-09-22 | Resolved: 2022-10-22

## 2022-10-27 ENCOUNTER — OFFICE VISIT (OUTPATIENT)
Dept: CARDIOLOGY CLINIC | Age: 52
End: 2022-10-27
Payer: COMMERCIAL

## 2022-10-27 ENCOUNTER — OFFICE VISIT (OUTPATIENT)
Dept: BARIATRICS/WEIGHT MGMT | Age: 52
End: 2022-10-27
Payer: COMMERCIAL

## 2022-10-27 VITALS
BODY MASS INDEX: 50.02 KG/M2 | OXYGEN SATURATION: 97 % | SYSTOLIC BLOOD PRESSURE: 136 MMHG | HEART RATE: 75 BPM | WEIGHT: 293 LBS | HEIGHT: 64 IN | DIASTOLIC BLOOD PRESSURE: 84 MMHG

## 2022-10-27 VITALS
HEIGHT: 64 IN | WEIGHT: 293 LBS | RESPIRATION RATE: 18 BRPM | SYSTOLIC BLOOD PRESSURE: 122 MMHG | HEART RATE: 84 BPM | DIASTOLIC BLOOD PRESSURE: 74 MMHG | OXYGEN SATURATION: 96 % | BODY MASS INDEX: 50.02 KG/M2

## 2022-10-27 DIAGNOSIS — E78.2 MIXED HYPERLIPIDEMIA: ICD-10-CM

## 2022-10-27 DIAGNOSIS — R73.03 PREDIABETES: ICD-10-CM

## 2022-10-27 DIAGNOSIS — E66.01 MORBID OBESITY WITH BMI OF 50.0-59.9, ADULT (HCC): Primary | ICD-10-CM

## 2022-10-27 DIAGNOSIS — K21.9 CHRONIC GERD: ICD-10-CM

## 2022-10-27 DIAGNOSIS — I10 ESSENTIAL HYPERTENSION: ICD-10-CM

## 2022-10-27 DIAGNOSIS — Z01.818 PREOPERATIVE CLEARANCE: ICD-10-CM

## 2022-10-27 DIAGNOSIS — I10 ESSENTIAL HYPERTENSION: Primary | ICD-10-CM

## 2022-10-27 PROCEDURE — 3078F DIAST BP <80 MM HG: CPT | Performed by: SURGERY

## 2022-10-27 PROCEDURE — 93000 ELECTROCARDIOGRAM COMPLETE: CPT | Performed by: INTERNAL MEDICINE

## 2022-10-27 PROCEDURE — 99204 OFFICE O/P NEW MOD 45 MIN: CPT | Performed by: INTERNAL MEDICINE

## 2022-10-27 PROCEDURE — 3078F DIAST BP <80 MM HG: CPT | Performed by: INTERNAL MEDICINE

## 2022-10-27 PROCEDURE — 99214 OFFICE O/P EST MOD 30 MIN: CPT | Performed by: SURGERY

## 2022-10-27 PROCEDURE — 3074F SYST BP LT 130 MM HG: CPT | Performed by: INTERNAL MEDICINE

## 2022-10-27 PROCEDURE — 3074F SYST BP LT 130 MM HG: CPT | Performed by: SURGERY

## 2022-10-27 NOTE — PROGRESS NOTES
[facility]  [phone]      Attending Physician: No att. providers found  Reason for Consultation/Chief Complaint: New patient, cardiac clearance    Subjective   History of Present Illness:  Fabian Ortega is a 46 y.o. patient who presents today as a new patient referral from Yordan Garcia MD for further evaluation for cardiac clearance for bariatric procedure. Today she reports that she does have high blood pressure and is on two BP meds for it, she verbalized diltiazem and valsartan. She reports that in 2013 she had her gall bladder removed. She reports that she is going to have gastric sleeve surgery. She denies chest pain, sob, dizziness, syncope and edema. She is able to walk 2-3 blocks w/o difficulty. Past Medical History:   has a past medical history of Acid reflux, Hypertension, and MVP (mitral valve prolapse). Surgical History:   has a past surgical history that includes Tubal ligation; Tonsillectomy; Adenoidectomy; ovarian cyst removal; Cholecystectomy; and Endometrial ablation (11/22/2013). Social History:   reports that she has never smoked. She has never used smokeless tobacco. She reports that she does not drink alcohol and does not use drugs. Family History:  family history includes Cancer in her father, mother, and sister; Heart Disease in her father and mother. Home Medications:  Were reviewed and are listed in nursing record and/or below  Prior to Admission medications    Medication Sig Start Date End Date Taking?  Authorizing Provider   albuterol sulfate HFA (VENTOLIN HFA) 108 (90 Base) MCG/ACT inhaler Inhale 2 puffs into the lungs 4 times daily as needed for Wheezing 10/14/22  Yes Marybeth Leventhal, MD   valsartan (DIOVAN) 160 MG tablet TAKE 1 TABLET BY MOUTH EVERY DAY 8/5/22  Yes Historical Provider, MD   omeprazole (PRILOSEC) 40 MG delayed release capsule TAKE 1 CAPSULE BY MOUTH EVERY DAY BEFORE A MEAL 8/5/22  Yes Historical Provider, MD Vitamin E 400 units TABS Take by mouth   Yes Historical Provider, MD   VENLAFAXINE HCL PO Take 150 mg by mouth daily 150mg   Yes Historical Provider, MD   diltiazem (CARDIZEM CD) 120 MG extended release capsule Take 2 capsules by mouth daily 10/23/16 10/27/22 Yes Tami Koo PA-C   loratadine (CLARITIN) 10 MG capsule Take  by mouth. Yes Historical Provider, MD        CURRENT Medications:  No current facility-administered medications for this visit. Allergies:  Erythromycin and Metformin     Review of Systems:   A 14 point review of symptoms completed. Pertinent positives identified in the HPI, all other review of symptoms negative as below.       Objective   PHYSICAL EXAM:    Vitals:    10/27/22 1401   BP: 136/84   Pulse: 75   SpO2: 97%    Weight: (!) 321 lb (145.6 kg)         General Appearance:  Alert, cooperative, no distress, appears stated age   Head:  Normocephalic, without obvious abnormality, atraumatic   Eyes:  PERRL, conjunctiva/corneas clear   Nose: Nares normal, no drainage or sinus tenderness   Throat: Lips, mucosa, and tongue normal   Neck: Supple, symmetrical, trachea midline, no adenopathy, thyroid: not enlarged, symmetric, no tenderness/mass/nodules, no carotid bruit or JVD   Lungs:   Clear to auscultation bilaterally, respirations unlabored   Chest Wall:  No deformity or tenderness   Heart:  Regular rate and rhythm, S1, S2 normal, 1/6 sm    Abdomen:   Soft, non-tender, bowel sounds active all four quadrants,  no masses, no organomegaly   Extremities: Extremities normal, atraumatic, no cyanosis or edema   Pulses: 2+ and symmetric   Skin: Skin color, texture, turgor normal, no rashes or lesions   Pysch: Normal mood and affect   Neurologic: Normal gross motor and sensory exam.         Labs   CBC:   Lab Results   Component Value Date/Time    WBC 7.4 10/06/2022 08:50 AM    RBC 4.75 10/06/2022 08:50 AM    HGB 13.3 10/06/2022 08:50 AM    HCT 39.6 10/06/2022 08:50 AM    MCV 83.4 10/06/2022 08:50 AM    RDW 14.4 10/06/2022 08:50 AM     10/06/2022 08:50 AM     CMP:  Lab Results   Component Value Date/Time     10/06/2022 08:50 AM    K 4.6 10/06/2022 08:50 AM     10/06/2022 08:50 AM    CO2 25 10/06/2022 08:50 AM    BUN 16 10/06/2022 08:50 AM    CREATININE 0.9 10/06/2022 08:50 AM    GFRAA >60 10/06/2022 08:50 AM    GFRAA >60 2012 03:36 PM    AGRATIO 1.6 10/06/2022 08:50 AM    LABGLOM >60 10/06/2022 08:50 AM    GLUCOSE 104 10/06/2022 08:50 AM    PROT 6.9 10/06/2022 08:50 AM    PROT 7.6 2012 03:36 PM    CALCIUM 9.5 10/06/2022 08:50 AM    BILITOT 0.3 10/06/2022 08:50 AM    ALKPHOS 116 10/06/2022 08:50 AM    AST 15 10/06/2022 08:50 AM    ALT 17 10/06/2022 08:50 AM     PT/INR:  No results found for: PTINR  HgBA1c:  Lab Results   Component Value Date    LABA1C 5.4 10/06/2022     Lab Results   Component Value Date    TROPONINI <0.01 2015         Cardiac Data     Last EK/7/15  NSR, HR 76    Today nsr, nonspec st changes, similar to prior ekg from 2015     Echo:9/29/10    1. The left ventricle is normal in size and systolic function,   estimated ejection fraction is 55-60%. Doppler exam is consistent   with normal diastolic function. 2.    The right ventricle is normal in size and systolic function. 3.    Bilateral atria are normal in size. No defects are noted in   the intraatrial septum. 4.    The mitral valve is structurally normal with trivial   regurgitation. 5.    The aortic valve is trileaflet and structurally normal with a   normal Doppler exam.   6.    The tricuspid valve is structurally normal with trivial   regurgitation. Estimated pulmonary artery systolic pressure is 27-   32 mmHg. 7.    The pulmonic valve is structurally normal with trivial   regurgitation. 8.    There is no significant pericardial effusion. 9.    The aortic root is normal in size. The vena cava is   nondilated. IMPRESSION-  Normal left ventricular size and function. Stress Test:    Cath:    Studies:       I have reviewed labs and imaging/xray/diagnostic testing in this note. Assessment and Plan      1. Essential hypertension    2. Preoperative clearance            PLAN:  Continue to take all medications as prescribed, valsartan 160 for htn, dilt 240 for htn  Intermediate cardiovascular risk. May proceed with planned upcoming procedure. Follow up as needed. Scribe's attestation: This note was scribed in the presence of Dr. Forrest Mahmood MD   by Judy Blackmon LPN    I, Dr Forrest Mahmood, personally performed the services described in this documentation, as scribed by the above signed scribe in my presence. It is both accurate and complete to my knowledge. I agree with the details independently gathered by the clinical support staff and the scribed note accurately describes my personal service to the patient. Thank you for allowing us to participate in the care of Leighton Randolph. Please call me with any questions [phone].     Forrest Mahmood MD, [facility]   Interventional Cardiologist  [facility]  [phone] [facility]  [phone] [address] [place] [address]  10/27/2022 2:20 PM

## 2022-10-27 NOTE — PATIENT INSTRUCTIONS
PLAN:  Continue to take all medications as prescribed  Intermediate cardiovascular risk. May proceed with planned upcoming procedure. Follow up as needed.

## 2022-10-27 NOTE — PROGRESS NOTES
Jim Comer lost 3.2 lbs over past 5 weeks. Pt is allergic to artificial grape flavoring/coconut. Breakfast: turkey sausage with eggs     Snack: not usually     Lunch: PB sandwich (2 slices of bread) OR oatmeal made with water     Snack: carrots (plain) OR pack of PB crackers OR celery with 3 Tbsp of PB     Dinner: ground turkey for chili sometimes with spaghetti OR turkey meat lennox with cheese with green beans     Snack: [employer] brownie (70 calories)     Is pt consuming smaller portions? Feels she is doing better and is using smaller plates, could still work on some     Is pt consuming at least 64 oz of fluids per day? Yes     Is pt consuming carbonated, caffeinated, or sugary beverages? No - Drinks decaf coffee with creamer, gatorade zero, 1% milk for meals, unsweetened tea with lemon and sweetener (has caffeinated), eliminated soda/alcohol    Has pt sampled Unjury and/or Nectar protein? Tried and tolerated one but needs to try one more     Has patient attended a support group?  Not yet, scheduled today for 11/15    Exercise: walking for 20 minutes (around the block) 4-5 x week     Plan/Recommendations:   Try one more protein shake flavor   Decrease sandwich to 1/2 and add F/V + measure PB   Try more protein powder flavors   Switch to decaf tea     Handouts: protein bar, protein content, portion control, SG schedule     Teetee Robin RD, MONTY

## 2022-10-27 NOTE — LETTER
Zoila Martinez  1970      [address] [facility]  [phone]      Attending Physician: No att. providers found  Reason for Consultation/Chief Complaint: New patient, cardiac clearance    Subjective   History of Present Illness:  Zoila Martinez is a 46 y.o. patient who presents today as a new patient referral from Lokesh Horn MD for further evaluation for cardiac clearance for bariatric procedure. Today she reports that she does have high blood pressure and is on two BP meds for it, she verbalized diltiazem and valsartan. She reports that in 2013 she had her gall bladder removed. She reports that she is going to have gastric sleeve surgery. She denies chest pain, sob, dizziness, syncope and edema. She is able to walk 2-3 blocks w/o difficulty. Past Medical History:   has a past medical history of Acid reflux, Hypertension, and MVP (mitral valve prolapse). Surgical History:   has a past surgical history that includes Tubal ligation; Tonsillectomy; Adenoidectomy; ovarian cyst removal; Cholecystectomy; and Endometrial ablation (11/22/2013). Social History:   reports that she has never smoked. She has never used smokeless tobacco. She reports that she does not drink alcohol and does not use drugs. Family History:  family history includes Cancer in her father, mother, and sister; Heart Disease in her father and mother. Home Medications:  Were reviewed and are listed in nursing record and/or below  Prior to Admission medications    Medication Sig Start Date End Date Taking?  Authorizing Provider   albuterol sulfate HFA (VENTOLIN HFA) 108 (90 Base) MCG/ACT inhaler Inhale 2 puffs into the lungs 4 times daily as needed for Wheezing 10/14/22  Yes Yohannes Valencia MD   valsartan (DIOVAN) 160 MG tablet TAKE 1 TABLET BY MOUTH EVERY DAY 8/5/22  Yes Historical Provider, MD   omeprazole (PRILOSEC) 40 MG delayed release capsule TAKE 1 CAPSULE BY MOUTH EVERY DAY BEFORE A MEAL 8/5/22  Yes Historical Provider, MD   Vitamin E 400 units TABS Take by mouth   Yes Historical Provider, MD   VENLAFAXINE HCL PO Take 150 mg by mouth daily 150mg   Yes Historical Provider, MD   diltiazem (CARDIZEM CD) 120 MG extended release capsule Take 2 capsules by mouth daily 10/23/16 10/27/22 Yes Tami Koo PA-C   loratadine (CLARITIN) 10 MG capsule Take  by mouth. Yes Historical Provider, MD        CURRENT Medications:  No current facility-administered medications for this visit. Allergies:  Erythromycin and Metformin     Review of Systems:   A 14 point review of symptoms completed. Pertinent positives identified in the HPI, all other review of symptoms negative as below.       Objective   PHYSICAL EXAM:    Vitals:    10/27/22 1401   BP: 136/84   Pulse: 75   SpO2: 97%    Weight: (!) 321 lb (145.6 kg)         General Appearance:  Alert, cooperative, no distress, appears stated age   Head:  Normocephalic, without obvious abnormality, atraumatic   Eyes:  PERRL, conjunctiva/corneas clear   Nose: Nares normal, no drainage or sinus tenderness   Throat: Lips, mucosa, and tongue normal   Neck: Supple, symmetrical, trachea midline, no adenopathy, thyroid: not enlarged, symmetric, no tenderness/mass/nodules, no carotid bruit or JVD   Lungs:   Clear to auscultation bilaterally, respirations unlabored   Chest Wall:  No deformity or tenderness   Heart:  Regular rate and rhythm, S1, S2 normal, 1/6 sm    Abdomen:   Soft, non-tender, bowel sounds active all four quadrants,  no masses, no organomegaly   Extremities: Extremities normal, atraumatic, no cyanosis or edema   Pulses: 2+ and symmetric   Skin: Skin color, texture, turgor normal, no rashes or lesions   Pysch: Normal mood and affect   Neurologic: Normal gross motor and sensory exam.         Labs   CBC:   Lab Results   Component Value Date/Time    WBC 7.4 10/06/2022 08:50 AM    RBC 4.75 10/06/2022 08:50 AM    HGB 13.3 10/06/2022 08:50 AM    HCT 39.6 10/06/2022 08:50 AM    MCV 83.4 10/06/2022 08:50 AM    RDW 14.4 10/06/2022 08:50 AM     10/06/2022 08:50 AM     CMP:  Lab Results   Component Value Date/Time     10/06/2022 08:50 AM    K 4.6 10/06/2022 08:50 AM     10/06/2022 08:50 AM    CO2 25 10/06/2022 08:50 AM    BUN 16 10/06/2022 08:50 AM    CREATININE 0.9 10/06/2022 08:50 AM    GFRAA >60 10/06/2022 08:50 AM    GFRAA >60 2012 03:36 PM    AGRATIO 1.6 10/06/2022 08:50 AM    LABGLOM >60 10/06/2022 08:50 AM    GLUCOSE 104 10/06/2022 08:50 AM    PROT 6.9 10/06/2022 08:50 AM    PROT 7.6 2012 03:36 PM    CALCIUM 9.5 10/06/2022 08:50 AM    BILITOT 0.3 10/06/2022 08:50 AM    ALKPHOS 116 10/06/2022 08:50 AM    AST 15 10/06/2022 08:50 AM    ALT 17 10/06/2022 08:50 AM     PT/INR:  No results found for: PTINR  HgBA1c:  Lab Results   Component Value Date    LABA1C 5.4 10/06/2022     Lab Results   Component Value Date    TROPONINI <0.01 2015         Cardiac Data     Last EK/7/15  NSR, HR 76    Today nsr, nonspec st changes, similar to prior ekg from      Echo:9/29/10    1. The left ventricle is normal in size and systolic function,   estimated ejection fraction is 55-60%. Doppler exam is consistent   with normal diastolic function. 2.    The right ventricle is normal in size and systolic function. 3.    Bilateral atria are normal in size. No defects are noted in   the intraatrial septum. 4.    The mitral valve is structurally normal with trivial   regurgitation. 5.    The aortic valve is trileaflet and structurally normal with a   normal Doppler exam.   6.    The tricuspid valve is structurally normal with trivial   regurgitation. Estimated pulmonary artery systolic pressure is 27-   32 mmHg. 7.    The pulmonic valve is structurally normal with trivial   regurgitation. 8.    There is no significant pericardial effusion. 9.    The aortic root is normal in size. The vena cava is   nondilated.       IMPRESSION-  Normal left ventricular size and function. Stress Test:    Cath:    Studies:       I have reviewed labs and imaging/xray/diagnostic testing in this note. Assessment and Plan      1. Essential hypertension    2. Preoperative clearance            PLAN:  1. Continue to take all medications as prescribed, valsartan 160 for htn, dilt 240 for htn  2. Intermediate cardiovascular risk. May proceed with planned upcoming procedure. 3. Follow up as needed. Scribe's attestation: This note was scribed in the presence of Dr. Elen Tripp MD   by Liana Clark LPN    I, Dr Elen Tripp, personally performed the services described in this documentation, as scribed by the above signed scribe in my presence. It is both accurate and complete to my knowledge. I agree with the details independently gathered by the clinical support staff and the scribed note accurately describes my personal service to the patient. Thank you for allowing us to participate in the care of Fabian Ortega. Please call me with any questions [phone].     Elen Tripp MD, [facility] [place]   Interventional Cardiologist  [facility]  [phone] [facility]  [phone] [address] [place] [address]  10/27/2022 2:20 PM

## 2022-10-27 NOTE — PROGRESS NOTES
[address] [facility]   [employer]  Marciano Gowers, MD, [facility], [address], [address] [facility]    [facility] [address] . Phone: [phone]  Fax: [phone]          Chief Complaint   Patient presents with    Obesity     2nd pre-surg         HPI:     Peyman Pruitt is a very pleasant 46 y.o. female with Body mass index is 55.1 kg/m². / Chronic Obesity. Yara Colon has been struggling for several years now with obesity. Yara Colon feels the weight is an obstacle to achieve and perform things in daily living as well risk on health. Patient  is very determined to lose weight and be healthy, and is working towards  surgical weight loss to achieve this goal. Pre-operative clearance and work up pending. Working hard to keep good dietary habits as well level of activity. Patient denies any nausea, vomiting, fevers, chills, shortness of breath, chest pain, cough, constipation or difficulty urinating. Pain Assessment   Denies any abdominal pain       Past Medical History:   Diagnosis Date    Acid reflux     Hypertension     MVP (mitral valve prolapse)      Past Surgical History:   Procedure Laterality Date    ADENOIDECTOMY      CHOLECYSTECTOMY      ENDOMETRIAL ABLATION  11/22/2013    DILATATION AND CURETTAGE HYSTEROSCOPY WITH NOVASURE ABLATION    OVARIAN CYST REMOVAL      TONSILLECTOMY      TUBAL LIGATION       Family History   Problem Relation Age of Onset    Heart Disease Mother     Cancer Mother     Heart Disease Father     Cancer Father     Cancer Sister      Social History     Tobacco Use    Smoking status: Never    Smokeless tobacco: Never   Substance Use Topics    Alcohol use: No     I counseled the patient on the importance of not smoking and risks of ETOH.    Allergies   Allergen Reactions    Erythromycin Nausea And Vomiting    Metformin Nausea And Vomiting     Vitals:    10/27/22 1429   BP: 122/74   Pulse: 84   Resp: 18   SpO2: 96%   Weight: (!) 321 lb (145.6 kg)   Height: 5' 4\" (1.626 m)       Body mass index is 55.1 kg/m².     Lab Results   Component Value Date/Time    WBC 7.4 10/06/2022 08:50 AM    RBC 4.75 10/06/2022 08:50 AM    HGB 13.3 10/06/2022 08:50 AM    HCT 39.6 10/06/2022 08:50 AM    MCV 83.4 10/06/2022 08:50 AM    MCH 28.0 10/06/2022 08:50 AM    MCHC 33.5 10/06/2022 08:50 AM    MPV 6.9 10/06/2022 08:50 AM    NEUTOPHILPCT 58.6 10/06/2022 08:50 AM    LYMPHOPCT 34.8 10/06/2022 08:50 AM    MONOPCT 6.2 10/06/2022 08:50 AM    EOSRELPCT 0.0 10/06/2022 08:50 AM    BASOPCT 0.4 10/06/2022 08:50 AM    NEUTROABS 4.4 10/06/2022 08:50 AM    LYMPHSABS 2.6 10/06/2022 08:50 AM    MONOSABS 0.5 10/06/2022 08:50 AM    EOSABS 0.0 10/06/2022 08:50 AM     Lab Results   Component Value Date/Time     10/06/2022 08:50 AM    K 4.6 10/06/2022 08:50 AM     10/06/2022 08:50 AM    CO2 25 10/06/2022 08:50 AM    ANIONGAP 11 10/06/2022 08:50 AM    GLUCOSE 104 10/06/2022 08:50 AM    BUN 16 10/06/2022 08:50 AM    CREATININE 0.9 10/06/2022 08:50 AM    LABGLOM >60 10/06/2022 08:50 AM    GFRAA >60 10/06/2022 08:50 AM    GFRAA >60 08/14/2012 03:36 PM    CALCIUM 9.5 10/06/2022 08:50 AM    PROT 6.9 10/06/2022 08:50 AM    PROT 7.6 08/14/2012 03:36 PM    LABALBU 4.2 10/06/2022 08:50 AM    AGRATIO 1.6 10/06/2022 08:50 AM    BILITOT 0.3 10/06/2022 08:50 AM    ALKPHOS 116 10/06/2022 08:50 AM    ALT 17 10/06/2022 08:50 AM    AST 15 10/06/2022 08:50 AM    GLOB 3.3 03/15/2019 02:06 PM     Lab Results   Component Value Date/Time    CHOL 219 10/06/2022 08:50 AM    TRIG 134 10/06/2022 08:50 AM    HDL 39 10/06/2022 08:50 AM    HDL 43 09/29/2010 05:15 AM    LDLCALC 153 10/06/2022 08:50 AM    LABVLDL 27 10/06/2022 08:50 AM     Lab Results   Component Value Date/Time    TSHREFLEX 2.44 10/06/2022 08:50 AM     Lab Results   Component Value Date/Time    IRON 78 10/06/2022 08:50 AM    TIBC 287 10/06/2022 08:50 AM    LABIRON 27 10/06/2022 08:50 AM     Lab Results   Component Value Date/Time    CPLIOBOJ94 499 10/06/2022 08:50 AM    FOLATE 5.51 10/06/2022 08:50 AM     Lab Results   Component Value Date/Time    VITD25 34.7 10/06/2022 08:50 AM     Lab Results   Component Value Date/Time    LABA1C 5.4 10/06/2022 08:50 AM    .3 10/06/2022 08:50 AM         Current Outpatient Medications:     albuterol sulfate HFA (VENTOLIN HFA) 108 (90 Base) MCG/ACT inhaler, Inhale 2 puffs into the lungs 4 times daily as needed for Wheezing, Disp: 54 g, Rfl: 1    valsartan (DIOVAN) 160 MG tablet, TAKE 1 TABLET BY MOUTH EVERY DAY, Disp: , Rfl:     omeprazole (PRILOSEC) 40 MG delayed release capsule, TAKE 1 CAPSULE BY MOUTH EVERY DAY BEFORE A MEAL, Disp: , Rfl:     Vitamin E 400 units TABS, Take by mouth, Disp: , Rfl:     VENLAFAXINE HCL PO, Take 150 mg by mouth daily 150mg, Disp: , Rfl:     diltiazem (CARDIZEM CD) 120 MG extended release capsule, Take 2 capsules by mouth daily, Disp: 30 capsule, Rfl: 0    loratadine (CLARITIN) 10 MG capsule, Take  by mouth., Disp: , Rfl:     Review of Systems - History obtained from the patient  General ROS: negative  Psychological ROS: negative  Ophthalmic ROS: negative  Neurological ROS: negative  ENT ROS: negative  Allergy and Immunology ROS: negative  Hematological and Lymphatic ROS: negative  Endocrine ROS: negative  Breast ROS: negative  Respiratory ROS: negative  Cardiovascular ROS: negative  Gastrointestinal ROS:negative  Genito-Urinary ROS: negative  Musculoskeletal ROS: negative   Skin ROS: negative    Physical Exam   Vitals Reviewed   Constitutional: Patient is oriented to person, place, and time. Patient appears well-developed and well-nourished. Patient is active and cooperative. Non-toxic appearance. No distress. HENT:   Head: Normocephalic and atraumatic. Head is without abrasion and without laceration. Hair is normal.   Right Ear: External ear normal. No lacerations. No drainage, swelling . Left Ear: External ear normal. No lacerations. No drainage, swelling.    Nose/Mouth: face mask in place  Eyes: Conjunctivae, EOM and lids are normal. Right eye exhibits no discharge. No foreign body present in the right eye. Left eye exhibits no discharge. No foreign body present in the left eye. No scleral icterus. Neck: Trachea normal and normal range of motion. No JVD present. Pulmonary/Chest: Effort normal. No accessory muscle usage or stridor. No apnea. No respiratory distress. Cardiovascular: Normal rate and no JVD. Abdominal: Normal appearance. Patient exhibits no distension. Abdomen is soft, obese, non tender. Musculoskeletal: Normal range of motion. Patient exhibits no edema. Neurological: Patient is alert and oriented to person, place, and time. Patient has normal strength. GCS eye subscore is 4. GCS verbal subscore is 5. GCS motor subscore is 6. Skin: Skin is warm and dry. No abrasion and no rash noted. Patient is not diaphoretic. No cyanosis or erythema. Psychiatric: Patient has a normal mood and affect. Speech is normal and behavior is normal. Cognition and memory are normal.       A/P    Shani Su is 46 y.o. female, Body mass index is 55.1 kg/m². pre surgery, has lost 3 lbs since last visit. The patient underwent extensive dietary counseling with registered dietician. I have reviewed, discussed and agree with the dietary plan. Patient is trying hard to keep good dietary and behavior modifications. Patient is monitoring portion sizes, food choices and liquid calories. Patient is trying to exercise regularly as much as possible. Obesity as a disease is considered a high risk to patients overall health and should therefore be considered a high risk disease state. Advised the patient that not getting there weight under control, that could increase risk of complications/worsening of those conditions on the long-term.  (Goal of weight loss surgery is to alleviate/control some of those co-morbidities)    Now with Covid-19 pandemic, CDC and health authorities does classify obese patients as vulnerable and high risk as well. Which makes weight loss a priority for improvement of their wellbeing and overall health. I encouraged the patient to continue exercise and keeping healthy eating habits. Discussed pre-op labs and work up till now. Also counseled the patient extensively on Surgery. I did explain thoroughly to the patient that compliance with pre- and post op diet and other recommendations are integral part to improve the chances of successful weight loss and also not following it could end with serious health complications. Some strategies discussed today include but not limited to : 30/30/30 minutes rule, food diary, avoid fast food and packing/planing ahead, & increasing exercise. Also stressed to the patient importance of taking the multivitamins as instructed, otherwise risk significant complications. The visit today, included any number of the following: Bariatric Preoperative work up/protocols, review of labs, imaging, provider notes, outside hospital records, performing examination/evaluation, counseling patient and/or family, ordering medications/tests, placing referrals and communication with referring physicians, coordination of care; discussing dietary plan/recall with the patient as well with registered dietitian and documentation in the EHR. Of note, the above was done during same day of the actual patient encounter. [facility] is here for her second presurgical visit. Patient had her labs done and that was discussed in detail showing hyperlipidemia. Patient seeing her pulmonologist as well and work on the remaining preoperative clearances. Overall making good progress. We will see the patient next month for continued follow-up    We discussed how her excess weight affects her overall health and importance of weight loss, healthy diet and active lifestyle to alleviate those co morbid conditions, otherwise risk deterioration.        Morbid Obesity: Body mass index is 55.1 kg/m². [x] Continue to make dietary and lifestyle modifications. [x] Plan for Future laparoscopic sleeve gastrectomy. [x] Return for follow-up next month. Chronic GERD:   [x] Continue to make dietary and lifestyle modifications. [] Continue Omeprazole. [] Continue Famotidine. [x] Plan for EGD to evaluate the stomach. Essential Hypertension:  [x] Continue to make dietary and lifestyle modifications. [x] Reviewed the importance of checking blood pressure. [x] Continue to follow up with their PCP for medication management and monitoring. Hyperlipidemia:   [x] Continue to make dietary and lifestyle modifications. [x] Continue to follow up with their PCP for medication management and monitoring. Patient advised that its their responsibility to follow up for studies, referrals and/or labs ordered today.

## 2022-11-16 NOTE — PROGRESS NOTES
Patient reached ____ yes  __X___ no   VM instructions left __X__ yes   phone number _[phone]_______                                ____ no-office notified          Date __11/23/22_______  Time __1200_____  Arrival _1000  hosp-endo_____    Nothing to eat or drink after midnight-follow your doctors prep instructions-this may include taking a second dose of your prep after midnight  Responsible adult 25 or older to stay on site while you are here-drive you home-stay with you after  Follow any instructions your doctors office has given you  Bring a complete list of all your medications and supplements including name,dose,how often taken the day of your procedure  If you normally take the following medications in the morning please do so the AM of your procedure with a small sip of water       Heart,blood pressure,seizure,thyroid or breathing medications-use your inhalers-bring any rescue inhalers with you DOS       DO NOT take blood pressure medications ending in \"dottie\" or \"pril\" the AM of procedure or evening prior  Dr Junnie Cogan patients are not to any medications the AM of surgery  Take half or your normal dose of any long acting insulins the night before your procedure-do not take any diabetic medications the AM of procedure  Follow your doctors instructions regarding stopping or taking  any blood thinners-if you do not have instructions-call them  Any questions call your doctor  Other ______________________________________________________________      Cherrie Edward POLICY(subject to change)             The current policy is 2 visitors per patient. There are no children allowed. Mask at discretion of facility. Visiting hours are 8a-8p. Overnight visitors will be at the discretion of the nurse. All policies are subject to change.

## 2022-11-17 ENCOUNTER — OFFICE VISIT (OUTPATIENT)
Dept: BARIATRICS/WEIGHT MGMT | Age: 52
End: 2022-11-17
Payer: COMMERCIAL

## 2022-11-17 VITALS
HEIGHT: 64 IN | SYSTOLIC BLOOD PRESSURE: 137 MMHG | RESPIRATION RATE: 18 BRPM | WEIGHT: 293 LBS | DIASTOLIC BLOOD PRESSURE: 60 MMHG | OXYGEN SATURATION: 100 % | HEART RATE: 98 BPM | BODY MASS INDEX: 50.02 KG/M2

## 2022-11-17 DIAGNOSIS — E66.01 MORBID OBESITY WITH BMI OF 50.0-59.9, ADULT (HCC): Primary | ICD-10-CM

## 2022-11-17 DIAGNOSIS — E78.2 MIXED HYPERLIPIDEMIA: ICD-10-CM

## 2022-11-17 DIAGNOSIS — K21.9 CHRONIC GERD: ICD-10-CM

## 2022-11-17 DIAGNOSIS — E88.81 METABOLIC SYNDROME: ICD-10-CM

## 2022-11-17 DIAGNOSIS — I10 ESSENTIAL HYPERTENSION: ICD-10-CM

## 2022-11-17 DIAGNOSIS — R73.03 PREDIABETES: ICD-10-CM

## 2022-11-17 PROCEDURE — 3078F DIAST BP <80 MM HG: CPT | Performed by: SURGERY

## 2022-11-17 PROCEDURE — 99214 OFFICE O/P EST MOD 30 MIN: CPT | Performed by: SURGERY

## 2022-11-17 PROCEDURE — 3074F SYST BP LT 130 MM HG: CPT | Performed by: SURGERY

## 2022-11-17 NOTE — PROGRESS NOTES
[address] gained 2 lbs over 3 weeks. Pt is allergic to artificial grape flavoring/coconut. Breakfast: turkey sausage with eggs     Snack: nothing or lite key lime yogurt    Lunch: salad with HB egg and ff ranch, sometimes with PB & 1 slice bread    Snack: skips or pc of fruit    Dinner: lean ground turkey or chix breast w buffalo sauce, green beans/broccoli/cauli     Snack: protein shake    Is pt eating at least 4 times everyday? yes she is    Is pt eating a lean protein source with all meals and snacks? yes she is     Has pt decreased their portions using the plate method? yes    Is pt choosing low fat/sugar free options? yes  1% milk 2 cups per day  Is pt drinking at least 64 oz of clear liquids everyday? yes    Has pt stopped drinking carbonation, caffeinated, and sugar sweetened beverages? Drinks decaf coffee with creamer, gatorade zero, 1% milk for meals, unsweetened tea with lemon and sweetener (has caffeinated), eliminated soda/alcohol    Has pt sampled Unjury and/or Nectar protein?      Support Group: 11/15    Participating in intentional exercise? walking for 20 minutes (around the block) 4-5 x week    Plan/Recommendations:   mental cravings - refer to behaviorist     Handouts: Coping with cravings     Arianna Sanderson MS, RD, LD

## 2022-11-17 NOTE — PROGRESS NOTES
[address] [facility]   [employer]  Isra Wheat MD, [facility], [address], [address] [facility]    [facility] [address] . Phone: [phone]  Fax: [phone]          Chief Complaint   Patient presents with    Obesity     3rd pre-surg         HPI:     Lula Menard is a very pleasant 46 y.o. female with Body mass index is 55.44 kg/m². / Chronic Obesity. Alicia Marina has been struggling for several years now with obesity. Alicia Marina feels the weight is an obstacle to achieve and perform things in daily living as well risk on health. Patient  is very determined to lose weight and be healthy, and is working towards  surgical weight loss to achieve this goal. Pre-operative clearance and work up pending. Working hard to keep good dietary habits as well level of activity. Patient denies any nausea, vomiting, fevers, chills, shortness of breath, chest pain, cough, constipation or difficulty urinating. Pain Assessment   Denies any abdominal pain       Past Medical History:   Diagnosis Date    Acid reflux     Hypertension     MVP (mitral valve prolapse)      Past Surgical History:   Procedure Laterality Date    ADENOIDECTOMY      CHOLECYSTECTOMY      ENDOMETRIAL ABLATION  11/22/2013    DILATATION AND CURETTAGE HYSTEROSCOPY WITH NOVASURE ABLATION    OVARIAN CYST REMOVAL      TONSILLECTOMY      TUBAL LIGATION       Family History   Problem Relation Age of Onset    Heart Disease Mother     Cancer Mother     Heart Disease Father     Cancer Father     Cancer Sister      Social History     Tobacco Use    Smoking status: Never    Smokeless tobacco: Never   Substance Use Topics    Alcohol use: No     I counseled the patient on the importance of not smoking and risks of ETOH.    Allergies   Allergen Reactions    Erythromycin Nausea And Vomiting    Metformin Nausea And Vomiting     Vitals:    11/17/22 1544   BP: 137/60   Pulse: 98   Resp: 18   SpO2: 100%   Weight: (!) 323 lb (146.5 kg)   Height: 5' 4\" (1.626 m)       Body mass index is 55.44 kg/m².     Lab Results   Component Value Date/Time    WBC 7.4 10/06/2022 08:50 AM    RBC 4.75 10/06/2022 08:50 AM    HGB 13.3 10/06/2022 08:50 AM    HCT 39.6 10/06/2022 08:50 AM    MCV 83.4 10/06/2022 08:50 AM    MCH 28.0 10/06/2022 08:50 AM    MCHC 33.5 10/06/2022 08:50 AM    MPV 6.9 10/06/2022 08:50 AM    NEUTOPHILPCT 58.6 10/06/2022 08:50 AM    LYMPHOPCT 34.8 10/06/2022 08:50 AM    MONOPCT 6.2 10/06/2022 08:50 AM    EOSRELPCT 0.0 10/06/2022 08:50 AM    BASOPCT 0.4 10/06/2022 08:50 AM    NEUTROABS 4.4 10/06/2022 08:50 AM    LYMPHSABS 2.6 10/06/2022 08:50 AM    MONOSABS 0.5 10/06/2022 08:50 AM    EOSABS 0.0 10/06/2022 08:50 AM     Lab Results   Component Value Date/Time     10/06/2022 08:50 AM    K 4.6 10/06/2022 08:50 AM     10/06/2022 08:50 AM    CO2 25 10/06/2022 08:50 AM    ANIONGAP 11 10/06/2022 08:50 AM    GLUCOSE 104 10/06/2022 08:50 AM    BUN 16 10/06/2022 08:50 AM    CREATININE 0.9 10/06/2022 08:50 AM    LABGLOM >60 10/06/2022 08:50 AM    GFRAA >60 10/06/2022 08:50 AM    GFRAA >60 08/14/2012 03:36 PM    CALCIUM 9.5 10/06/2022 08:50 AM    PROT 6.9 10/06/2022 08:50 AM    PROT 7.6 08/14/2012 03:36 PM    LABALBU 4.2 10/06/2022 08:50 AM    AGRATIO 1.6 10/06/2022 08:50 AM    BILITOT 0.3 10/06/2022 08:50 AM    ALKPHOS 116 10/06/2022 08:50 AM    ALT 17 10/06/2022 08:50 AM    AST 15 10/06/2022 08:50 AM    GLOB 3.3 03/15/2019 02:06 PM     Lab Results   Component Value Date/Time    CHOL 219 10/06/2022 08:50 AM    TRIG 134 10/06/2022 08:50 AM    HDL 39 10/06/2022 08:50 AM    HDL 43 09/29/2010 05:15 AM    LDLCALC 153 10/06/2022 08:50 AM    LABVLDL 27 10/06/2022 08:50 AM     Lab Results   Component Value Date/Time    TSHREFLEX 2.44 10/06/2022 08:50 AM     Lab Results   Component Value Date/Time    IRON 78 10/06/2022 08:50 AM    TIBC 287 10/06/2022 08:50 AM    LABIRON 27 10/06/2022 08:50 AM     Lab Results   Component Value Date/Time    YJERRFUC86 499 10/06/2022 08:50 AM    FOLATE 5.51 10/06/2022 08:50 AM     Lab Results   Component Value Date/Time    VITD25 34.7 10/06/2022 08:50 AM     Lab Results   Component Value Date/Time    LABA1C 5.4 10/06/2022 08:50 AM    .3 10/06/2022 08:50 AM         Current Outpatient Medications:     albuterol sulfate HFA (VENTOLIN HFA) 108 (90 Base) MCG/ACT inhaler, Inhale 2 puffs into the lungs 4 times daily as needed for Wheezing, Disp: 54 g, Rfl: 1    valsartan (DIOVAN) 160 MG tablet, TAKE 1 TABLET BY MOUTH EVERY DAY, Disp: , Rfl:     omeprazole (PRILOSEC) 40 MG delayed release capsule, TAKE 1 CAPSULE BY MOUTH EVERY DAY BEFORE A MEAL, Disp: , Rfl:     Vitamin E 400 units TABS, Take by mouth, Disp: , Rfl:     VENLAFAXINE HCL PO, Take 150 mg by mouth daily 150mg, Disp: , Rfl:     loratadine (CLARITIN) 10 MG capsule, Take  by mouth., Disp: , Rfl:     diltiazem (CARDIZEM CD) 120 MG extended release capsule, Take 2 capsules by mouth daily, Disp: 30 capsule, Rfl: 0    Review of Systems - History obtained from the patient  General ROS: negative  Psychological ROS: negative  Ophthalmic ROS: negative  Neurological ROS: negative  ENT ROS: negative  Allergy and Immunology ROS: negative  Hematological and Lymphatic ROS: negative  Endocrine ROS: negative  Breast ROS: negative  Respiratory ROS: negative  Cardiovascular ROS: negative  Gastrointestinal ROS:negative  Genito-Urinary ROS: negative  Musculoskeletal ROS: negative   Skin ROS: negative    Physical Exam   Vitals Reviewed   Constitutional: Patient is oriented to person, place, and time. Patient appears well-developed and well-nourished. Patient is active and cooperative. Non-toxic appearance. No distress. HENT:   Head: Normocephalic and atraumatic. Head is without abrasion and without laceration. Hair is normal.   Right Ear: External ear normal. No lacerations. No drainage, swelling . Left Ear: External ear normal. No lacerations. No drainage, swelling.    Nose/Mouth: face mask in place  Eyes: Conjunctivae, EOM and lids are normal. Right eye exhibits no discharge. No foreign body present in the right eye. Left eye exhibits no discharge. No foreign body present in the left eye. No scleral icterus. Neck: Trachea normal and normal range of motion. No JVD present. Pulmonary/Chest: Effort normal. No accessory muscle usage or stridor. No apnea. No respiratory distress. Cardiovascular: Normal rate and no JVD. Abdominal: Normal appearance. Patient exhibits no distension. Abdomen is soft, obese, non tender. Musculoskeletal: Normal range of motion. Patient exhibits no edema. Neurological: Patient is alert and oriented to person, place, and time. Patient has normal strength. GCS eye subscore is 4. GCS verbal subscore is 5. GCS motor subscore is 6. Skin: Skin is warm and dry. No abrasion and no rash noted. Patient is not diaphoretic. No cyanosis or erythema. Psychiatric: Patient has a normal mood and affect. Speech is normal and behavior is normal. Cognition and memory are normal.       A/P    Cecily Search is 46 y.o. female, Body mass index is 55.44 kg/m². pre surgery, has gained 2 lbs since last visit. The patient underwent extensive dietary counseling with registered dietician. I have reviewed, discussed and agree with the dietary plan. Patient is trying hard to keep good dietary and behavior modifications. Patient is monitoring portion sizes, food choices and liquid calories. Patient is trying to exercise regularly as much as possible. Obesity as a disease is considered a high risk to patients overall health and should therefore be considered a high risk disease state. Advised the patient that not getting there weight under control, that could increase risk of complications/worsening of those conditions on the long-term.  (Goal of weight loss surgery is to alleviate/control some of those co-morbidities)    Now with Covid-19 pandemic, CDC and health authorities does classify obese patients as vulnerable and high risk as well. Which makes weight loss a priority for improvement of their wellbeing and overall health. I encouraged the patient to continue exercise and keeping healthy eating habits. Discussed pre-op labs and work up till now. Also counseled the patient extensively on Surgery. I did explain thoroughly to the patient that compliance with pre- and post op diet and other recommendations are integral part to improve the chances of successful weight loss and also not following it could end with serious health complications. Some strategies discussed today include but not limited to : 30/30/30 minutes rule, food diary, avoid fast food and packing/planing ahead, & increasing exercise. Also stressed to the patient importance of taking the multivitamins as instructed, otherwise risk significant complications. The visit today, included any number of the following: Bariatric Preoperative work up/protocols, review of labs, imaging, provider notes, outside hospital records, performing examination/evaluation, counseling patient and/or family, ordering medications/tests, placing referrals and communication with referring physicians, coordination of care; discussing dietary plan/recall with the patient as well with registered dietitian and documentation in the EHR. Of note, the above was done during same day of the actual patient encounter. Radha Rock is here for her third presurgical visit. Patient still working her preoperative clearances. Overall making good progress. Need to try the protein shakes. We will see the patient next month for continued follow-up. We discussed how her excess weight affects her overall health and importance of weight loss, healthy diet and active lifestyle to alleviate those co morbid conditions, otherwise risk deterioration. Morbid Obesity: Body mass index is 55.44 kg/m².   [x] Continue to make dietary and lifestyle modifications. [x] Plan for Future laparoscopic sleeve gastrectomy. [x] Return for follow-up next month. Chronic GERD:   [x] Continue to make dietary and lifestyle modifications. [] Continue Omeprazole. [] Continue Famotidine. [x] Plan for EGD to evaluate the stomach. Essential Hypertension:  [x] Continue to make dietary and lifestyle modifications. [x] Reviewed the importance of checking blood pressure. [x] Continue to follow up with their PCP for medication management and monitoring. Prediabetes / Diabetes Mellitus Type II in Obese:   [x] Continue to make dietary and lifestyle modifications. [x] Reviewed the importance of checking blood sugars. [x] Continue to follow up with their PCP for medication management and monitoring. Hyperlipidemia:   [x] Continue to make dietary and lifestyle modifications. [x] Continue to follow up with their PCP for medication management and monitoring. Obstructive Sleep Apnea:   [x] Continue to make dietary and lifestyle modifications. [x] Reviewed the importance of wearing / compliance with CPAP/BIPAP. [x] Continue to follow up with their sleep medicine provider for CPAP/BIPAP management and monitoring. Metabolic Syndrome:  [x] Continue to make dietary and lifestyle modifications. [x] Continue to follow up with their PCP for Medical Management. [x] Continue with weight loss. Patient advised that its their responsibility to follow up for studies, referrals and/or labs ordered today.

## 2022-11-18 ENCOUNTER — HOSPITAL ENCOUNTER (OUTPATIENT)
Dept: PULMONOLOGY | Age: 52
Discharge: HOME | End: 2022-11-18
Payer: COMMERCIAL

## 2022-11-18 VITALS — OXYGEN SATURATION: 97 %

## 2022-11-18 DIAGNOSIS — Z01.818 PREOPERATIVE CLEARANCE: ICD-10-CM

## 2022-11-18 DIAGNOSIS — R06.02 SOB (SHORTNESS OF BREATH): ICD-10-CM

## 2022-11-18 PROCEDURE — 94760 N-INVAS EAR/PLS OXIMETRY 1: CPT

## 2022-11-18 PROCEDURE — 94010 BREATHING CAPACITY TEST: CPT

## 2022-11-18 PROCEDURE — 94729 DIFFUSING CAPACITY: CPT

## 2022-11-18 PROCEDURE — 94070 EVALUATION OF WHEEZING: CPT

## 2022-11-18 PROCEDURE — 6360000002 HC RX W HCPCS: Performed by: INTERNAL MEDICINE

## 2022-11-18 PROCEDURE — 94726 PLETHYSMOGRAPHY LUNG VOLUMES: CPT

## 2022-11-18 RX ORDER — ALBUTEROL SULFATE 2.5 MG/3ML
2.5 SOLUTION RESPIRATORY (INHALATION) ONCE
Status: COMPLETED | OUTPATIENT
Start: 2022-11-18 | End: 2022-11-18

## 2022-11-18 RX ADMIN — METHACHOLINE CHLORIDE 2.5 MG: 100 POWDER, FOR SOLUTION RESPIRATORY (INHALATION) at 14:31

## 2022-11-18 RX ADMIN — METHACHOLINE CHLORIDE 10 MG: 100 POWDER, FOR SOLUTION RESPIRATORY (INHALATION) at 14:34

## 2022-11-18 RX ADMIN — METHACHOLINE CHLORIDE 25 MG: 100 POWDER, FOR SOLUTION RESPIRATORY (INHALATION) at 14:40

## 2022-11-18 RX ADMIN — METHACHOLINE CHLORIDE 0.25 MG: 100 POWDER, FOR SOLUTION RESPIRATORY (INHALATION) at 14:24

## 2022-11-18 RX ADMIN — ALBUTEROL SULFATE 2.5 MG: 2.5 SOLUTION RESPIRATORY (INHALATION) at 14:45

## 2022-11-23 ENCOUNTER — HOSPITAL ENCOUNTER (OUTPATIENT)
Age: 52
Setting detail: OUTPATIENT SURGERY
Discharge: HOME | End: 2022-11-23
Attending: SURGERY | Admitting: SURGERY
Payer: COMMERCIAL

## 2022-11-23 ENCOUNTER — ANESTHESIA (OUTPATIENT)
Dept: ENDOSCOPY | Age: 52
End: 2022-11-23
Payer: COMMERCIAL

## 2022-11-23 ENCOUNTER — ANESTHESIA EVENT (OUTPATIENT)
Dept: ENDOSCOPY | Age: 52
End: 2022-11-23
Payer: COMMERCIAL

## 2022-11-23 VITALS
WEIGHT: 293 LBS | OXYGEN SATURATION: 96 % | TEMPERATURE: 98.3 F | SYSTOLIC BLOOD PRESSURE: 132 MMHG | RESPIRATION RATE: 16 BRPM | HEIGHT: 64 IN | BODY MASS INDEX: 50.02 KG/M2 | HEART RATE: 81 BPM | DIASTOLIC BLOOD PRESSURE: 76 MMHG

## 2022-11-23 DIAGNOSIS — K21.9 GASTROESOPHAGEAL REFLUX DISEASE, UNSPECIFIED WHETHER ESOPHAGITIS PRESENT: ICD-10-CM

## 2022-11-23 LAB — HCG(URINE) PREGNANCY TEST: NEGATIVE

## 2022-11-23 PROCEDURE — 7100000011 HC PHASE II RECOVERY - ADDTL 15 MIN: Performed by: SURGERY

## 2022-11-23 PROCEDURE — 3700000000 HC ANESTHESIA ATTENDED CARE: Performed by: SURGERY

## 2022-11-23 PROCEDURE — 2709999900 HC NON-CHARGEABLE SUPPLY: Performed by: SURGERY

## 2022-11-23 PROCEDURE — 2500000003 HC RX 250 WO HCPCS: Performed by: NURSE ANESTHETIST, CERTIFIED REGISTERED

## 2022-11-23 PROCEDURE — 3609012400 HC EGD TRANSORAL BIOPSY SINGLE/MULTIPLE: Performed by: SURGERY

## 2022-11-23 PROCEDURE — 2580000003 HC RX 258: Performed by: SURGERY

## 2022-11-23 PROCEDURE — 6360000002 HC RX W HCPCS: Performed by: NURSE ANESTHETIST, CERTIFIED REGISTERED

## 2022-11-23 PROCEDURE — 43239 EGD BIOPSY SINGLE/MULTIPLE: CPT | Performed by: SURGERY

## 2022-11-23 PROCEDURE — 84703 CHORIONIC GONADOTROPIN ASSAY: CPT

## 2022-11-23 PROCEDURE — 7100000010 HC PHASE II RECOVERY - FIRST 15 MIN: Performed by: SURGERY

## 2022-11-23 PROCEDURE — 88305 TISSUE EXAM BY PATHOLOGIST: CPT

## 2022-11-23 RX ORDER — DILTIAZEM HYDROCHLORIDE 240 MG/1
240 CAPSULE, COATED, EXTENDED RELEASE ORAL DAILY
COMMUNITY

## 2022-11-23 RX ORDER — SODIUM CHLORIDE 9 MG/ML
INJECTION, SOLUTION INTRAVENOUS CONTINUOUS
Status: DISCONTINUED | OUTPATIENT
Start: 2022-11-23 | End: 2022-11-23

## 2022-11-23 RX ORDER — PROPOFOL 10 MG/ML
INJECTION, EMULSION INTRAVENOUS PRN
Status: DISCONTINUED | OUTPATIENT
Start: 2022-11-23 | End: 2022-11-23 | Stop reason: SDUPTHER

## 2022-11-23 RX ORDER — SODIUM CHLORIDE 9 MG/ML
INJECTION, SOLUTION INTRAVENOUS CONTINUOUS
Status: DISCONTINUED | OUTPATIENT
Start: 2022-11-23 | End: 2022-11-23 | Stop reason: HOSPADM

## 2022-11-23 RX ORDER — LIDOCAINE HYDROCHLORIDE 20 MG/ML
INJECTION, SOLUTION EPIDURAL; INFILTRATION; INTRACAUDAL; PERINEURAL PRN
Status: DISCONTINUED | OUTPATIENT
Start: 2022-11-23 | End: 2022-11-23 | Stop reason: SDUPTHER

## 2022-11-23 RX ADMIN — SODIUM CHLORIDE: 9 INJECTION, SOLUTION INTRAVENOUS at 08:04

## 2022-11-23 RX ADMIN — PROPOFOL 50 MG: 10 INJECTION, EMULSION INTRAVENOUS at 09:28

## 2022-11-23 RX ADMIN — PROPOFOL 50 MG: 10 INJECTION, EMULSION INTRAVENOUS at 09:29

## 2022-11-23 RX ADMIN — PROPOFOL 50 MG: 10 INJECTION, EMULSION INTRAVENOUS at 09:27

## 2022-11-23 RX ADMIN — LIDOCAINE HYDROCHLORIDE 50 MG: 20 INJECTION, SOLUTION EPIDURAL; INFILTRATION; INTRACAUDAL; PERINEURAL at 09:27

## 2022-11-23 ASSESSMENT — PAIN - FUNCTIONAL ASSESSMENT: PAIN_FUNCTIONAL_ASSESSMENT: 0-10

## 2022-11-23 NOTE — PROGRESS NOTES
Dr Lim Free at bedside to speak with pt. Pt awake and aelrt, VSS. Able to dress and ambulate. Pt discharged via wheel chair in stable condition to  to be transported home.

## 2022-11-23 NOTE — DISCHARGE INSTRUCTIONS
ENDOSCOPY DISCHARGE INSTRUCTIONS    You may experience some lightheadedness for the next several hours. Plan on quiet relaxation for the rest of today. A responsible adult needs to stay with you today. Because of the medications you received today-do not drive,operate machinery,or sign any contractual agreement for the next 24 hours. Do not drink any alcoholic beverages or take any unprescribed medications tonight. Eat bland food and avoid anything greasy or spicy initially-progress to your normal diet gradually. Diet restrictions as instructed. You may resume home medications as instructed. It is not unusual to experience some mild cramping or gas pains, and you may not have a bowel movement for several days. If you have any of the following problems, notify your physician or return to the hospital emergency room : fever, chills, excessive bleeding, excessive vomiting, difficulty swallowing, uncontrolled pain, increased abdominal distention, shortness of breath or any other problems. If you have a sore throat, you may use lozenges or salt water gargles. Please call Dr. Lita Alarcon office in 5 business days for biopsy results [phone].

## 2022-11-23 NOTE — H&P
Department of General Surgery - [facility] [place]   [employer]  Attending Pre-operative History and Physical      DIAGNOSIS:  Obesity    INDICATION:  Pre-op    PROCEDURE:  EGD    CHIEF COMPLAINT:  Obesity    History Obtained From:  patient    HISTORY OF PRESENT ILLNESS:    The patient is a 46 y.o. female with significant past medical history of   Patient Active Problem List   Diagnosis    Chest pain    Menorrhagia    Essential hypertension    Preoperative clearance    Chronic GERD    Morbid obesity with BMI of 50.0-59.9, adult ([address]    Prediabetes    Mixed hyperlipidemia    Metabolic syndrome      who presents for pre-op EGD    Past Medical History:        Diagnosis Date    Acid reflux     Hypertension     MVP (mitral valve prolapse)      Past Surgical History:        Procedure Laterality Date    ADENOIDECTOMY      CHOLECYSTECTOMY      ENDOMETRIAL ABLATION  11/22/2013    DILATATION AND CURETTAGE HYSTEROSCOPY WITH NOVASURE ABLATION    OVARIAN CYST REMOVAL      TONSILLECTOMY      TUBAL LIGATION       Medications Prior to Admission:   Medications Prior to Admission: dilTIAZem (CARDIZEM CD) 240 MG extended release capsule, Take 240 mg by mouth daily  albuterol sulfate HFA (VENTOLIN HFA) 108 (90 Base) MCG/ACT inhaler, Inhale 2 puffs into the lungs 4 times daily as needed for Wheezing  valsartan (DIOVAN) 160 MG tablet, TAKE 1 TABLET BY MOUTH EVERY DAY  omeprazole (PRILOSEC) 40 MG delayed release capsule, TAKE 1 CAPSULE BY MOUTH EVERY DAY BEFORE A MEAL  Vitamin E 400 units TABS, Take by mouth  VENLAFAXINE HCL PO, Take 150 mg by mouth daily 150mg  diltiazem (CARDIZEM CD) 120 MG extended release capsule, Take 2 capsules by mouth daily  loratadine (CLARITIN) 10 MG capsule, Take  by mouth. Allergies:  Erythromycin and Metformin    Social History:   TOBACCO:   reports that she has never smoked.  She has never used smokeless tobacco.  ETOH:   reports no history of alcohol use.  Family History:       Problem Relation Age of Onset    Heart Disease Mother     Cancer Mother     Heart Disease Father     Cancer Father     Cancer Sister          REVIEW OF SYSTEMS:    Review of Systems - History obtained from the patient  General ROS: negative  Psychological ROS: negative  Ophthalmic ROS: negative  Neurological ROS: negative  ENT ROS: negative  Allergy and Immunology ROS: negative  Hematological and Lymphatic ROS: negative  Endocrine ROS: negative  Breast ROS: negative  Respiratory ROS: negative  Cardiovascular ROS: negative  Gastrointestinal ROS:negative  Genito-Urinary ROS: negative  Musculoskeletal ROS: negative   Skin ROS: negative      PHYSICAL EXAM:      BP (!) 162/81   Pulse 85   Temp 98.2 °F (36.8 °C) (Temporal)   Resp 20   Ht 5' 4\" (1.626 m)   Wt (!) 320 lb (145.2 kg)   SpO2 99%   BMI 54.93 kg/m²  I      Physical Exam   Vitals Reviewed   Constitutional: Patient is oriented to person, place, and time. Patient appears well-developed and well-nourished. Patient is active and cooperative. Non-toxic appearance. No distress. HENT:   Head: Normocephalic and atraumatic. Head is without abrasion and without laceration. Hair is normal.   Right Ear: External ear normal. No lacerations. No drainage, swelling . Left Ear: External ear normal. No lacerations. No drainage, swelling. Nose: Nose normal. No nose lacerations or nasal deformity. Eyes: Conjunctivae, EOM and lids are normal. Right eye exhibits no discharge. No foreign body present in the right eye. Left eye exhibits no discharge. No foreign body present in the left eye. No scleral icterus. Neck: Trachea normal and normal range of motion. No JVD present. Pulmonary/Chest: Effort normal. No accessory muscle usage or stridor. No apnea. No respiratory distress. Cardiovascular: Normal rate and no JVD. Abdominal: Normal appearance. Patient exhibits no distension. Musculoskeletal: Normal range of motion.  Patient exhibits no edema. Neurological: Patient is alert and oriented to person, place, and time. Patient has normal strength. GCS eye subscore is 4. GCS verbal subscore is 5. GCS motor subscore is 6. Skin: Skin is warm and dry. No abrasion and no rash noted. Patient is not diaphoretic. No cyanosis or erythema. Psychiatric: Patient has a normal mood and affect. Speech is normal and behavior is normal. Cognition and memory are normal.       DATA:  CBC:   Lab Results   Component Value Date/Time    WBC 7.4 10/06/2022 08:50 AM    RBC 4.75 10/06/2022 08:50 AM    HGB 13.3 10/06/2022 08:50 AM    HCT 39.6 10/06/2022 08:50 AM    MCV 83.4 10/06/2022 08:50 AM    MCH 28.0 10/06/2022 08:50 AM    MCHC 33.5 10/06/2022 08:50 AM    RDW 14.4 10/06/2022 08:50 AM     10/06/2022 08:50 AM    MPV 6.9 10/06/2022 08:50 AM     CMP:    Lab Results   Component Value Date/Time     10/06/2022 08:50 AM    K 4.6 10/06/2022 08:50 AM     10/06/2022 08:50 AM    CO2 25 10/06/2022 08:50 AM    BUN 16 10/06/2022 08:50 AM    CREATININE 0.9 10/06/2022 08:50 AM    GFRAA >60 10/06/2022 08:50 AM    GFRAA >60 08/14/2012 03:36 PM    AGRATIO 1.6 10/06/2022 08:50 AM    LABGLOM >60 10/06/2022 08:50 AM    GLUCOSE 104 10/06/2022 08:50 AM    PROT 6.9 10/06/2022 08:50 AM    PROT 7.6 08/14/2012 03:36 PM    LABALBU 4.2 10/06/2022 08:50 AM    CALCIUM 9.5 10/06/2022 08:50 AM    BILITOT 0.3 10/06/2022 08:50 AM    ALKPHOS 116 10/06/2022 08:50 AM    AST 15 10/06/2022 08:50 AM    ALT 17 10/06/2022 08:50 AM       ASSESSMENT AND PLAN:      Obesity: Body mass index is 54.93 kg/m². [x] Continue to make dietary and lifestyle modifications. [x] Plan for Future laparoscopic sleeve gastrectomy. [x] Return for follow-up. Chronic GERD:   [x] Continue to make dietary and lifestyle modifications. [] Continue Omeprazole. [] Continue Famotidine. [x] Plan for EGD to evaluate the stomach.     I did explain thoroughly to the patient that compliance with pre- and post op diet and other recommendations are integral part to improve the chances of successful weight loss and also not following it could end with serious health complications. Some strategies discussed today include but not limited to : 30/30/30 minutes rule, food diary, avoid fast food and packing/planing ahead, & increasing exercise. 1.  Patient is a 46 y.o. female with above specified procedure planned EGD with deep sedation  2. Procedure options, risks and benefits reviewed with patient. Patient expresses understanding.       Electronically signed by Lv Julien MD , LUIS ARMANDO, [facility]  on 11/23/2022 at 9:05 AM

## 2022-11-23 NOTE — ANESTHESIA POSTPROCEDURE EVALUATION
Department of Anesthesiology  Postprocedure Note    Patient: Dai Landeros  MRN: [id number]  YOB: 1970  Date of evaluation: 11/23/2022      Procedure Summary     Date: 11/23/22 Room / Location: [address]    Anesthesia Start: 5723 Anesthesia Stop: 0933    Procedure: EGD BIOPSY (Abdomen) Diagnosis:       Gastroesophageal reflux disease, unspecified whether esophagitis present      (Gastroesophageal reflux disease, unspecified whether esophagitis present [K21.9])    Surgeons: Rashi Cota MD Responsible Provider: Keith Patiño MD    Anesthesia Type: MAC ASA Status: 3          Anesthesia Type: No value filed.     Maverick Phase I:      Maverick Phase II: Mavreick Score: 10      Anesthesia Post Evaluation    Patient location during evaluation: PACU  Patient participation: complete - patient participated  Level of consciousness: awake  Airway patency: patent  Nausea & Vomiting: no vomiting and no nausea  Complications: no  Cardiovascular status: hemodynamically stable  Respiratory status: acceptable  Hydration status: stable  Multimodal analgesia pain management approach

## 2022-11-23 NOTE — PROGRESS NOTES
Pt arrived from endo, report from crna/rn. Pt had EGD with biopsy, abdomen soft. Pt awake and alert, VSS. Respirations even unlabored and adequate on room air.

## 2022-11-23 NOTE — ANESTHESIA PRE PROCEDURE
Department of Anesthesiology  Preprocedure Note       Name:  Rosie Nunez   Age:  46 y.o.  :  1970                                          MRN:  [id number]         Date:  2022      Surgeon: Chino Moore): Roberto Obrien MD    Procedure: Procedure(s):  EGD DIAGNOSTIC ONLY    Medications prior to admission:   Prior to Admission medications    Medication Sig Start Date End Date Taking? Authorizing Provider   dilTIAZem (CARDIZEM CD) 240 MG extended release capsule Take 240 mg by mouth daily   Yes Historical Provider, MD   albuterol sulfate HFA (VENTOLIN HFA) 108 (90 Base) MCG/ACT inhaler Inhale 2 puffs into the lungs 4 times daily as needed for Wheezing 10/14/22   Dov Garibay MD   valsartan (DIOVAN) 160 MG tablet TAKE 1 TABLET BY MOUTH EVERY DAY 22   Historical Provider, MD   omeprazole (PRILOSEC) 40 MG delayed release capsule TAKE 1 CAPSULE BY MOUTH EVERY DAY BEFORE A MEAL 22   Historical Provider, MD   Vitamin E 400 units TABS Take by mouth    Historical Provider, MD   VENLAFAXINE HCL PO Take 150 mg by mouth daily 150mg    Historical Provider, MD   diltiazem (CARDIZEM CD) 120 MG extended release capsule Take 2 capsules by mouth daily 10/23/16 10/27/22  Tami Koo PA-C   loratadine (CLARITIN) 10 MG capsule Take  by mouth. Historical Provider, MD       Current medications:    Current Facility-Administered Medications   Medication Dose Route Frequency Provider Last Rate Last Admin    0.9 % sodium chloride infusion   IntraVENous Continuous Roberto Obrien  mL/hr at 22 0804 New Bag at 22 0804       Allergies:     Allergies   Allergen Reactions    Erythromycin Nausea And Vomiting    Metformin Nausea And Vomiting       Problem List:    Patient Active Problem List   Diagnosis Code    Chest pain R07.9    Menorrhagia N92.0    Essential hypertension I10    Preoperative clearance Z01.818    Chronic GERD K21.9    Morbid obesity with BMI of 50.0-59.9, adult (HCC) E66.01, Z68.43    Prediabetes R73.03    Mixed hyperlipidemia U89.1    Metabolic syndrome K23.05       Past Medical History:        Diagnosis Date    Acid reflux     Hypertension     MVP (mitral valve prolapse)        Past Surgical History:        Procedure Laterality Date    ADENOIDECTOMY      CHOLECYSTECTOMY      ENDOMETRIAL ABLATION  11/22/2013    DILATATION AND CURETTAGE HYSTEROSCOPY WITH NOVASURE ABLATION    OVARIAN CYST REMOVAL      TONSILLECTOMY      TUBAL LIGATION         Social History:    Social History     Tobacco Use    Smoking status: Never    Smokeless tobacco: Never   Substance Use Topics    Alcohol use: No                                Counseling given: Not Answered      Vital Signs (Current):   Vitals:    11/23/22 0737 11/23/22 0746   BP:  (!) 162/81   Pulse:  85   Resp:  20   Temp: 98.2 °F (36.8 °C)    TempSrc: Temporal    SpO2:  99%   Weight: (!) 320 lb (145.2 kg)    Height: 5' 4\" (1.626 m)                                               BP Readings from Last 3 Encounters:   11/23/22 (!) 162/81   11/17/22 137/60   10/27/22 122/74       NPO Status: Time of last liquid consumption: 0000                        Time of last solid consumption: 0000                        Date of last liquid consumption: 11/23/22                        Date of last solid food consumption: 11/23/22    BMI:   Wt Readings from Last 3 Encounters:   11/23/22 (!) 320 lb (145.2 kg)   11/17/22 (!) 323 lb (146.5 kg)   10/27/22 (!) 321 lb (145.6 kg)     Body mass index is 54.93 kg/m².     CBC:   Lab Results   Component Value Date/Time    WBC 7.4 10/06/2022 08:50 AM    RBC 4.75 10/06/2022 08:50 AM    HGB 13.3 10/06/2022 08:50 AM    HCT 39.6 10/06/2022 08:50 AM    MCV 83.4 10/06/2022 08:50 AM    RDW 14.4 10/06/2022 08:50 AM     10/06/2022 08:50 AM       CMP:   Lab Results   Component Value Date/Time     10/06/2022 08:50 AM    K 4.6 10/06/2022 08:50 AM     10/06/2022 08:50 AM    CO2 25 10/06/2022 08:50 AM BUN 16 10/06/2022 08:50 AM    CREATININE 0.9 10/06/2022 08:50 AM    GFRAA >60 10/06/2022 08:50 AM    GFRAA >60 08/14/2012 03:36 PM    AGRATIO 1.6 10/06/2022 08:50 AM    LABGLOM >60 10/06/2022 08:50 AM    GLUCOSE 104 10/06/2022 08:50 AM    PROT 6.9 10/06/2022 08:50 AM    PROT 7.6 08/14/2012 03:36 PM    CALCIUM 9.5 10/06/2022 08:50 AM    BILITOT 0.3 10/06/2022 08:50 AM    ALKPHOS 116 10/06/2022 08:50 AM    AST 15 10/06/2022 08:50 AM    ALT 17 10/06/2022 08:50 AM       POC Tests: No results for input(s): POCGLU, POCNA, POCK, POCCL, POCBUN, POCHEMO, POCHCT in the last 72 hours. Coags:   Lab Results   Component Value Date/Time    PROTIME 14.1 10/06/2022 08:50 AM    INR 1.11 10/06/2022 08:50 AM       HCG (If Applicable):   Lab Results   Component Value Date    PREGTESTUR Negative 11/23/2022        ABGs: No results found for: PHART, PO2ART, CUH7JUA, YVM3HQV, BEART, O7PTEUUN     Type & Screen (If Applicable):  No results found for: LABABO, LABRH    Drug/Infectious Status (If Applicable):  No results found for: HIV, HEPCAB    COVID-19 Screening (If Applicable): No results found for: COVID19        Anesthesia Evaluation  Patient summary reviewed and Nursing notes reviewed  Airway: Mallampati: III          Dental:          Pulmonary:                              Cardiovascular:    (+) hypertension:,                   Neuro/Psych:               GI/Hepatic/Renal:   (+) GERD:,           Endo/Other:                     Abdominal:             Vascular:           Other Findings:           Anesthesia Plan      MAC     ASA 3                                   Manuel Brown MD   11/23/2022

## 2022-11-24 PROCEDURE — 94070 EVALUATION OF WHEEZING: CPT | Performed by: INTERNAL MEDICINE

## 2022-11-24 PROCEDURE — 94375 RESPIRATORY FLOW VOLUME LOOP: CPT | Performed by: INTERNAL MEDICINE

## 2022-11-24 PROCEDURE — 94726 PLETHYSMOGRAPHY LUNG VOLUMES: CPT | Performed by: INTERNAL MEDICINE

## 2022-11-24 PROCEDURE — 94729 DIFFUSING CAPACITY: CPT | Performed by: INTERNAL MEDICINE

## 2022-11-24 NOTE — PROCEDURES
[address] [address]                               PULMONARY FUNCTION    PATIENT NAME: Yisel Aguayo                   :        1970  MED REC NO:   [id number]                          ROOM:  ACCOUNT NO:   [de-identified]                           ADMIT DATE: 2022  PROVIDER:     Eve Jackson MD    DATE OF PROCEDURE:  2022    This is a full PFT. FEV1 of 2.76, 100% predicted; FVC of 3.44, 98%  predicted; FEV1 to FVC ratio of 80% showing no obstructive lung defect. The volumes do not show air trapping, hyperinflation, or restrictive  lung defect. Diffusion is normal even when adjusted for alveolar  ventilation. IMPRESSION:  Normal spirometry, lung volumes and diffusion.   Flow-volume  loops are normal.        Jennyfer Goncalves MD    D: 2022 12:06:39       T: 2022 19:25:29     [email or web address]  Job#: [id number]     Doc#: [id number]    CC:

## 2022-11-24 NOTE — PROCEDURES
[address] [address]                               PULMONARY FUNCTION    PATIENT NAME: Azalea Lua                   :        1970  MED REC NO:   [id number]                          ROOM:  ACCOUNT NO:   [de-identified]                           ADMIT DATE: 2022  PROVIDER:     Nate Salvador MD    DATE OF PROCEDURE:  2022    METHACHOLINE CHALLENGE    Five incrementally increasing doses of methacholine were given. There  was no drop in FEV1. IMPRESSION:  Negative methacholine.         Sonya Jason MD    D: 2022 12:06:39       T: 2022 19:26:17     [email or web address]  Job#: [id number]     Doc#: [id number]    CC:

## 2022-11-26 PROBLEM — Z01.818 PREOPERATIVE CLEARANCE: Status: RESOLVED | Noted: 2022-09-22 | Resolved: 2022-11-26

## 2022-12-08 ENCOUNTER — HOSPITAL ENCOUNTER (OUTPATIENT)
Dept: SLEEP CENTER | Age: 52
Discharge: HOME | End: 2022-12-10
Payer: COMMERCIAL

## 2022-12-08 DIAGNOSIS — G47.10 HYPERSOMNOLENCE: ICD-10-CM

## 2022-12-08 DIAGNOSIS — Z01.818 PREOPERATIVE CLEARANCE: ICD-10-CM

## 2022-12-08 DIAGNOSIS — G47.33 OSA (OBSTRUCTIVE SLEEP APNEA): ICD-10-CM

## 2022-12-08 PROCEDURE — 95806 SLEEP STUDY UNATT&RESP EFFT: CPT

## 2022-12-15 ENCOUNTER — OFFICE VISIT (OUTPATIENT)
Dept: BARIATRICS/WEIGHT MGMT | Age: 52
End: 2022-12-15

## 2022-12-15 VITALS
WEIGHT: 293 LBS | HEART RATE: 89 BPM | RESPIRATION RATE: 18 BRPM | HEIGHT: 64 IN | DIASTOLIC BLOOD PRESSURE: 77 MMHG | OXYGEN SATURATION: 97 % | BODY MASS INDEX: 50.02 KG/M2 | SYSTOLIC BLOOD PRESSURE: 133 MMHG

## 2022-12-15 DIAGNOSIS — E66.01 MORBID OBESITY WITH BMI OF 50.0-59.9, ADULT (HCC): Primary | ICD-10-CM

## 2022-12-15 DIAGNOSIS — R73.03 PREDIABETES: ICD-10-CM

## 2022-12-15 DIAGNOSIS — K21.9 CHRONIC GERD: ICD-10-CM

## 2022-12-15 DIAGNOSIS — I10 ESSENTIAL HYPERTENSION: ICD-10-CM

## 2022-12-15 DIAGNOSIS — E88.81 METABOLIC SYNDROME: ICD-10-CM

## 2022-12-15 DIAGNOSIS — E78.2 MIXED HYPERLIPIDEMIA: ICD-10-CM

## 2022-12-15 NOTE — PROGRESS NOTES
[facility]) Physicians   [employer]  Oracio Abraham MD, [facility], [address], [address] [facility] [address] . Phone: [phone]  Fax: [phone]          Chief Complaint   Patient presents with    Obesity         HPI:     Fabian Ortega is a very pleasant 46 y.o. female with Body mass index is 55.79 kg/m². / Chronic Obesity. Patricia Herman has been struggling for several years now with obesity. Patricia Herman feels the weight is an obstacle to achieve and perform things in daily living as well risk on health. Patient  is very determined to lose weight and be healthy, and is working towards  surgical weight loss to achieve this goal. Pre-operative clearance and work up pending. Working hard to keep good dietary habits as well level of activity. Patient denies any nausea, vomiting, fevers, chills, shortness of breath, chest pain, cough, constipation or difficulty urinating. Pain Assessment   Denies any abdominal pain       Past Medical History:   Diagnosis Date    Acid reflux     Hypertension     MVP (mitral valve prolapse)      Past Surgical History:   Procedure Laterality Date    ADENOIDECTOMY      CHOLECYSTECTOMY      ENDOMETRIAL ABLATION  11/22/2013    DILATATION AND CURETTAGE HYSTEROSCOPY WITH NOVASURE ABLATION    OVARIAN CYST REMOVAL      TONSILLECTOMY      TUBAL LIGATION      UPPER GASTROINTESTINAL ENDOSCOPY N/A 11/23/2022    EGD BIOPSY performed by Yordan Garcia MD at [address] [place] [address]     Family History   Problem Relation Age of Onset    Heart Disease Mother     Cancer Mother     Heart Disease Father     Cancer Father     Cancer Sister      Social History     Tobacco Use    Smoking status: Never    Smokeless tobacco: Never   Substance Use Topics    Alcohol use: No     I counseled the patient on the importance of not smoking and risks of ETOH.    Allergies   Allergen Reactions    Erythromycin Nausea And Vomiting    Metformin Nausea And Vomiting     Vitals: 12/15/22 1606   BP: 133/77   Pulse: 89   Resp: 18   SpO2: 97%   Weight: (!) 325 lb (147.4 kg)   Height: 5' 4\" (1.626 m)       Body mass index is 55.79 kg/m².     Lab Results   Component Value Date/Time    WBC 7.4 10/06/2022 08:50 AM    RBC 4.75 10/06/2022 08:50 AM    HGB 13.3 10/06/2022 08:50 AM    HCT 39.6 10/06/2022 08:50 AM    MCV 83.4 10/06/2022 08:50 AM    MCH 28.0 10/06/2022 08:50 AM    MCHC 33.5 10/06/2022 08:50 AM    MPV 6.9 10/06/2022 08:50 AM    NEUTOPHILPCT 58.6 10/06/2022 08:50 AM    LYMPHOPCT 34.8 10/06/2022 08:50 AM    MONOPCT 6.2 10/06/2022 08:50 AM    EOSRELPCT 0.0 10/06/2022 08:50 AM    BASOPCT 0.4 10/06/2022 08:50 AM    NEUTROABS 4.4 10/06/2022 08:50 AM    LYMPHSABS 2.6 10/06/2022 08:50 AM    MONOSABS 0.5 10/06/2022 08:50 AM    EOSABS 0.0 10/06/2022 08:50 AM     Lab Results   Component Value Date/Time     10/06/2022 08:50 AM    K 4.6 10/06/2022 08:50 AM     10/06/2022 08:50 AM    CO2 25 10/06/2022 08:50 AM    ANIONGAP 11 10/06/2022 08:50 AM    GLUCOSE 104 10/06/2022 08:50 AM    BUN 16 10/06/2022 08:50 AM    CREATININE 0.9 10/06/2022 08:50 AM    LABGLOM >60 10/06/2022 08:50 AM    GFRAA >60 10/06/2022 08:50 AM    GFRAA >60 08/14/2012 03:36 PM    CALCIUM 9.5 10/06/2022 08:50 AM    PROT 6.9 10/06/2022 08:50 AM    PROT 7.6 08/14/2012 03:36 PM    LABALBU 4.2 10/06/2022 08:50 AM    AGRATIO 1.6 10/06/2022 08:50 AM    BILITOT 0.3 10/06/2022 08:50 AM    ALKPHOS 116 10/06/2022 08:50 AM    ALT 17 10/06/2022 08:50 AM    AST 15 10/06/2022 08:50 AM    GLOB 3.3 03/15/2019 02:06 PM     Lab Results   Component Value Date/Time    CHOL 219 10/06/2022 08:50 AM    TRIG 134 10/06/2022 08:50 AM    HDL 39 10/06/2022 08:50 AM    HDL 43 09/29/2010 05:15 AM    LDLCALC 153 10/06/2022 08:50 AM    LABVLDL 27 10/06/2022 08:50 AM     Lab Results   Component Value Date/Time    TSHREFLEX 2.44 10/06/2022 08:50 AM     Lab Results   Component Value Date/Time    IRON 78 10/06/2022 08:50 AM    TIBC 287 10/06/2022 08:50 AM LABIRON 27 10/06/2022 08:50 AM     Lab Results   Component Value Date/Time    ZJABXSVQ15 499 10/06/2022 08:50 AM    FOLATE 5.51 10/06/2022 08:50 AM     Lab Results   Component Value Date/Time    VITD25 34.7 10/06/2022 08:50 AM     Lab Results   Component Value Date/Time    LABA1C 5.4 10/06/2022 08:50 AM    .3 10/06/2022 08:50 AM         Current Outpatient Medications:     dilTIAZem (CARDIZEM CD) 240 MG extended release capsule, Take 240 mg by mouth daily, Disp: , Rfl:     albuterol sulfate HFA (VENTOLIN HFA) 108 (90 Base) MCG/ACT inhaler, Inhale 2 puffs into the lungs 4 times daily as needed for Wheezing, Disp: 54 g, Rfl: 1    valsartan (DIOVAN) 160 MG tablet, TAKE 1 TABLET BY MOUTH EVERY DAY, Disp: , Rfl:     omeprazole (PRILOSEC) 40 MG delayed release capsule, TAKE 1 CAPSULE BY MOUTH EVERY DAY BEFORE A MEAL, Disp: , Rfl:     Vitamin E 400 units TABS, Take by mouth, Disp: , Rfl:     VENLAFAXINE HCL PO, Take 150 mg by mouth daily 150mg, Disp: , Rfl:     loratadine (CLARITIN) 10 MG capsule, Take  by mouth., Disp: , Rfl:     diltiazem (CARDIZEM CD) 120 MG extended release capsule, Take 2 capsules by mouth daily, Disp: 30 capsule, Rfl: 0    Review of Systems - History obtained from the patient  General ROS: negative  Psychological ROS: negative  Ophthalmic ROS: negative  Neurological ROS: negative  ENT ROS: negative  Allergy and Immunology ROS: negative  Hematological and Lymphatic ROS: negative  Endocrine ROS: negative  Breast ROS: negative  Respiratory ROS: negative  Cardiovascular ROS: negative  Gastrointestinal ROS:negative  Genito-Urinary ROS: negative  Musculoskeletal ROS: negative   Skin ROS: negative    Physical Exam   Vitals Reviewed   Constitutional: Patient is oriented to person, place, and time. Patient appears well-developed and well-nourished. Patient is active and cooperative. Non-toxic appearance. No distress. HENT:   Head: Normocephalic and atraumatic.  Head is without abrasion and without complications/worsening of those conditions on the long-term. (Goal of weight loss surgery is to alleviate/control some of those co-morbidities)    Now with Covid-19 pandemic, CDC and health authorities does classify obese patients as vulnerable and high risk as well. Which makes weight loss a priority for improvement of their wellbeing and overall health. I encouraged the patient to continue exercise and keeping healthy eating habits. Discussed pre-op labs and work up till now. Also counseled the patient extensively on Surgery. I did explain thoroughly to the patient that compliance with pre- and post op diet and other recommendations are integral part to improve the chances of successful weight loss and also not following it could end with serious health complications. Some strategies discussed today include but not limited to : 30/30/30 minutes rule, food diary, avoid fast food and packing/planing ahead, & increasing exercise. Also stressed to the patient importance of taking the multivitamins as instructed, otherwise risk significant complications. The visit today, included any number of the following: Bariatric Preoperative work up/protocols, review of labs, imaging, provider notes, outside hospital records, performing examination/evaluation, counseling patient and/or family, ordering medications/tests, placing referrals and communication with referring physicians, coordination of care; discussing dietary plan/recall with the patient as well with registered dietitian and documentation in the EHR. Of note, the above was done during same day of the actual patient encounter. Bipin  is here for her fourth presurgical visit. Patient overall stable. Awaiting her psych evaluation results as well as the pulmonary clearance. I am very hopeful patient will be ready to schedule by next visit.       We discussed how her excess weight affects her overall health and importance of weight loss, healthy diet and active lifestyle to alleviate those co morbid conditions, otherwise risk deterioration. Morbid Obesity: Body mass index is 55.79 kg/m². [x] Continue to make dietary and lifestyle modifications. [x] Plan for Future laparoscopic sleeve gastrectomy. [x] Return for follow-up next month. Chronic GERD:   [x] Continue to make dietary and lifestyle modifications. [x] Continue Omeprazole. [] Continue Famotidine. [] Plan for EGD to evaluate the stomach. Essential Hypertension:  [x] Continue to make dietary and lifestyle modifications. [x] Reviewed the importance of checking blood pressure. [x] Continue to follow up with their PCP for medication management and monitoring. Prediabetes / Diabetes Mellitus Type II in Obese:   [x] Continue to make dietary and lifestyle modifications. [x] Reviewed the importance of checking blood sugars. [x] Continue to follow up with their PCP for medication management and monitoring. Hyperlipidemia:   [x] Continue to make dietary and lifestyle modifications. [x] Continue to follow up with their PCP for medication management and monitoring. Metabolic Syndrome:  [x] Continue to make dietary and lifestyle modifications. [x] Continue to follow up with their PCP for Medical Management. [x] Continue with weight loss. Patient advised that its their responsibility to follow up for studies, referrals and/or labs ordered today.

## 2022-12-15 NOTE — PROGRESS NOTES
Waldo Comer lost 2 lbs over 1 month. Breakfast: yogurt-[employer] carbmaster with string cheese or oatmeal-1 packet mixed with water   Snack: none  Lunch: salad with cucumber, carrots, onions, small amount of cheese with fat free italian dressing  Snack: 1 orange  Dinner: 4 oz. pork chop with 1/2 cup of stuffing and apple   Snack: Nectar or unjury shake-chocolate      Drinks: crystal light, water, decaf coffee    Is pt eating a lean protein source with all meals and snacks? yes for the most part    Has pt decreased their portions using the plate method? yes she has     Is pt choosing low fat/sugar free options? yes working on    Is pt drinking at least 64 oz of clear liquids everyday? yes she is     Has pt stopped drinking carbonation, caffeinated, and sugar sweetened beverages? yes she has       Has pt sampled Unjury and/or Nectar protein? yes likes the chocolate splendor  and unjury chicken broth      Has pt attended a support group? Completed    Participating in intentional exercise?  yes walking 3x/week      Plan/Recommendations:     Limit carbohydrate to 1 serving per meal-reviewed sources   Pair protein with produce    Add protein to snack        Handouts: Presurgicial eating guideline    Riley Srivastava RD, [facility]

## 2022-12-20 ENCOUNTER — TELEPHONE (OUTPATIENT)
Dept: PULMONOLOGY | Age: 52
End: 2022-12-20

## 2022-12-20 PROBLEM — G47.33 OSA (OBSTRUCTIVE SLEEP APNEA): Status: ACTIVE | Noted: 2022-12-20

## 2022-12-20 NOTE — TELEPHONE ENCOUNTER
Called and left a message on the patient's phone  Explaining that the patient did have severe sleep apnea with an AHI of 42  Will set up with an AutoPap  Her DME will be [facility]  We will set up ASAP  Patient will call back once she has her machine so we can set up the a follow-up appointment                           [address]  [address] [address]  Dept: [phone]  Loc: [phone]     Diagnosis:  [x] ALEX (ICD-10: G47.33)  o CSA (ICD-10: G47.31)  [] Complex Sleep Apnea (ICD-10: G47.37)  []  (ICD-10: G47.37)  [] Hypoxemia (ICD-10: R09.02)  [] COPD (J44.90)  [] Chronic Respiratory Failure with hypoxemia (J96.11)  [] Chronicr Respiratory Failure with hypercapnia (J96.12)  [] Restrictive Lung Disease (J98.4)      [x] New Rx (Device Preference: _______ Tresia Minion ResMed __________________)     [] Change Order       [] Replace ___________  [] Clinical assessments and may include IN-Check device, spirometry and ETCO2 PRN    [] Discontinue Order -  [] CPAP    [] BPAP    [] PAP    [] Oxygen   /   AMA?  [] Yes   [] No              Therapy    AHI: ________ PERRY: ________  LOW SpO2: ________%      DME: [facility]    DEVICE / SETTINGS RAMP / COMFORT INTERFACE   []  CPAP () Pressure    Ramp: _________ min's  [] Ramp to patient preference General PAP Supply Kit  Medicaid does not cover heated tubing  ([employer])  PAP Tubing:  [x] Heated ()- 1/3 mos                         [x] Regular () - 1/3 mos  [x] Disposable Water Chamber () - 1/6 mos  [x] Disposable Filter () - 2/mo  [x] Non-Disposable Filter () - 1/6 mos   []  BiLevel PAP ()           IPAP        []  BiLevel PAP with   ()       Backup Rate ()      EPAP Rate  [] Adjust FLEX to patient comfort       SUPPLEMENTAL OXYGEN  [] OXYGEN:      Liter/min: _________  [] Continuous [] Nocturnal  [] Bleed into PAP Device      [x]  BeSmart 5                  Max Press 20  Mask Interface Kit    [x] Mask interface () - 1/3 mos  [x] Nasal Cushion () - 2/mo  [x] Nasal Pillows ()  -2/mo  [x] Headgear () - 1/6 mos   []  AutoBiLevel () Pressure  ()      Support           []  ResMed® IVAPS EPAP  [] Overnight Oximetry on Room Air  [] Overnight Oximetry on PAP Therapy  [] Overnight Oximetry on ___ LPM of oxygen  []  Overnight Oximetry on  PAP therapy with _____ lpm of O2    Target Pt Rate  Min PS      Target Va  Patient Ht  Ti Max                Ti Min        Rise time  Max PS  Trigger  Cycle  Epap  Epap max  Epap min  The patient has a history of:  [] Excessive Daytime Sleepiness  [] Insomnia  [] Impaired Cognition  [] Ischemic Heart Disease  [] Hypertension  [] Mood Disorders          [] History of Stroke  Additional Orders:______________________________________________________________________________________________________________    [] Titrate to comfort  [x] Add cloud access via [employer] or [employer] Full Face Mask Kit    [] Full face mask () - 1/3 mos  [] Full Face Cushion () - 1/mo  [] Headgear () - 1/6 mos                                           [] Respironics® ASV Advanced ()     EPAP Min  PS Min      EPAP Max  PS Max   Additional Supplies    [] Chin Strap ()  [x] Heated Humidifier Kit ()  Mask Specifications:   [] Patient Preference      -or-            Brand:______________ Size:_______________   Type: __________________________________   [] Mask Refit:___________________________  [x] Mask Fitting:  [] Full     [x] Nasal                [x]  Pillows                                Max Press  Ramp Time      Rate  Bi-flex: []1  []2  []3     [] Respironics® AVAPS: ()     IPAP Min  IPAP Max  Pressure Max  Epap max  Epap min  Rise time                      Avaps rate  EPAP   Additional Services    [] Annual PRN service and check of equipment  [] Routine service and check of equipment  [] Download and report compliance data   Tidal volume      Tigger                                     Rate                                         Inspiratory time                                                         The following equipment is Medically Necessary for the above stated patient. It is reasonable and necessary in reference to acceptable standards of medical practice for this condition, and is not prescribed as a convenience. Frequency of Use:    Daily                 Length of Need: 13 Months              o The patient requires BiLevel PAP and the following apply: []  The patient requires a Respiratory Assist Device (RAD) and the following apply:   o CPAP was tried and failed to meet therapeutic goals. [] CPAP was tried, but failed to meet therapeutic goals   o The prescribed mask interface has been properly fit, is the most comfortable to the patient and will be used with the BPAP device. [] The prescribed mask interface has been properly fit, is the most comfortable to the patient and will be used with the RAD.   o Current CPAP setting prevents patient from tolerating the therapy and lower CPAP settings fail to adequately control the symptoms of ALEX, improve sleep quality, or reduce AHI to acceptable levels. [] Current CPAP setting prevent patient from tolerating the therapy and lower PAP settings fail to  adequately control ALEX symptoms, improve sleep quality, or reduce AHI to acceptable levels.          [] There is significant improvement of the respiratory events on the RAD                                                                                                                                                                                                                                  Linette Durbin MD               NPI- [id number]     [facility]- [phone]                    12/20/22 ____________________________                        _______________________           Physician Signature                                                         Date

## 2023-02-06 ENCOUNTER — OFFICE VISIT (OUTPATIENT)
Dept: PULMONOLOGY | Age: 53
End: 2023-02-06
Payer: COMMERCIAL

## 2023-02-06 VITALS
TEMPERATURE: 98.8 F | DIASTOLIC BLOOD PRESSURE: 85 MMHG | SYSTOLIC BLOOD PRESSURE: 155 MMHG | BODY MASS INDEX: 50.02 KG/M2 | RESPIRATION RATE: 16 BRPM | OXYGEN SATURATION: 97 % | HEIGHT: 64 IN | WEIGHT: 293 LBS | HEART RATE: 86 BPM

## 2023-02-06 DIAGNOSIS — E66.01 MORBID OBESITY WITH BMI OF 50.0-59.9, ADULT (HCC): ICD-10-CM

## 2023-02-06 DIAGNOSIS — G47.33 OSA (OBSTRUCTIVE SLEEP APNEA): Primary | ICD-10-CM

## 2023-02-06 PROCEDURE — 3079F DIAST BP 80-89 MM HG: CPT | Performed by: NURSE PRACTITIONER

## 2023-02-06 PROCEDURE — 99213 OFFICE O/P EST LOW 20 MIN: CPT | Performed by: NURSE PRACTITIONER

## 2023-02-06 PROCEDURE — 3077F SYST BP >= 140 MM HG: CPT | Performed by: NURSE PRACTITIONER

## 2023-02-06 RX ORDER — VENLAFAXINE HYDROCHLORIDE 150 MG/1
CAPSULE, EXTENDED RELEASE ORAL
COMMUNITY
Start: 2023-02-02

## 2023-02-06 ASSESSMENT — SLEEP AND FATIGUE QUESTIONNAIRES
ESS TOTAL SCORE: 5
NECK CIRCUMFERENCE (INCHES): 15
HOW LIKELY ARE YOU TO NOD OFF OR FALL ASLEEP WHILE SITTING AND TALKING TO SOMEONE: 0
HOW LIKELY ARE YOU TO NOD OFF OR FALL ASLEEP IN A CAR, WHILE STOPPED FOR A FEW MINUTES IN TRAFFIC: 0
HOW LIKELY ARE YOU TO NOD OFF OR FALL ASLEEP WHILE LYING DOWN TO REST IN THE AFTERNOON WHEN CIRCUMSTANCES PERMIT: 1
HOW LIKELY ARE YOU TO NOD OFF OR FALL ASLEEP WHILE SITTING AND READING: 3
HOW LIKELY ARE YOU TO NOD OFF OR FALL ASLEEP WHEN YOU ARE A PASSENGER IN A CAR FOR AN HOUR WITHOUT A BREAK: 0
HOW LIKELY ARE YOU TO NOD OFF OR FALL ASLEEP WHILE SITTING INACTIVE IN A PUBLIC PLACE: 0
HOW LIKELY ARE YOU TO NOD OFF OR FALL ASLEEP WHILE WATCHING TV: 1
HOW LIKELY ARE YOU TO NOD OFF OR FALL ASLEEP WHILE SITTING QUIETLY AFTER LUNCH WITHOUT ALCOHOL: 0

## 2023-02-06 ASSESSMENT — ENCOUNTER SYMPTOMS
SHORTNESS OF BREATH: 0
ABDOMINAL PAIN: 0
WHEEZING: 0
RHINORRHEA: 0
EYE PAIN: 0
CHEST TIGHTNESS: 0
SORE THROAT: 0
COUGH: 0

## 2023-02-06 NOTE — PATIENT INSTRUCTIONS
Marifer Ham has good benefit and adherence on PAP therapy. Compliance report information was analyzed to assess complexity and medical decision making in regards to further testing and management. Will prescribe home medical equipment company to check pressures, download usage and will replace mask, tubing, disposables and filters as needed. May proceed with bariatric surgery. Instructed to wash or wipe face of excess oil before using CPAP to prevent the mask / nasal pillow from sliding and ensure proper fit. Empty the water daily and allow the chamber and tubings to air dry. Instructed how to properly clean the device to prevent bacteria and mold growth in the water chamber. Advised to avoid driving if too sleepy to function safely and given a discussion of the risks of untreated apneas such as accidents, cognitive impairment, mood impairment, worsening high blood pressure, various cardiac disease and stroke. Regarding obesity, recommend to try a formal program and/or increase physical activity by adding a 30 minute walk to daily routine. Weight loss was encouraged as a long term approach to treatment of ALEX. Explained the correlation between obesity and apnea and the causative role it can play. Follow up in December or sooner for any issues. MA Communication:   The following orders are received by verbal communication from Nanette Willams CNP  Follow up 12/2023

## 2023-02-06 NOTE — PROGRESS NOTES
Chief Complaint   Patient presents with    Follow-up     31 follow up   Medical Clearance for Bariatric Sleeve       Raegan Lombardi comes in today for follow-up of PAP therapy and fu breathing. .   Diagnosed with severe obstructive sleep apnea on the study done 12/8/22. (AHI 42.4, desat to 71%, weight 300 lbs)   Machine received January 2023    Patient had symptoms of EDS, snoring and poor sleep quality at time of diagnosis, resolved with pap therapy. Denies HA, ear popping or belching with PAP use. No recent changes in health history. Seeing Dr. Jodie Martin with bariatrics. Reports sleepiness is better. Is not having extended sleeping. Is using equipment for 7 hours a night. Up at night to use the bathroom about: 0-1  Is not snoring with machine. Does not have dry mouth   Is not complaining of mask issues,  nasal pillows. Is tolerating the pressure. Sleep Medicine 2/6/2023 10/14/2022   Sitting and reading 3 3   Watching TV 1 3   Sitting, inactive in a public place (e.g. a theatre or a meeting) 0 0   As a passenger in a car for an hour without a break 0 0   Lying down to rest in the afternoon when circumstances permit 1 0   Sitting and talking to someone 0 0   Sitting quietly after a lunch without alcohol 0 0   In a car, while stopped for a few minutes in traffic 0 0   Orrick Sleepiness Score 5 6   Neck circumference (Inches) 15 16.5     0 = no chance of dozing  1 = slight chance of dozing  2 = moderate chance of dozing  3 = high chance of dozing    Interpretation:   0-7: It is unlikely that you are abnormally sleepy. 8-9:     You have an average amount of daytime sleepiness. 10-15: You may be excessively sleepy depending on the situation. You may want to consider seeking medical attention. 16-24:   You are excessively sleepy and should consider seeking medical attention      PAP Compliance report reviewed dates 1/4/23-2/2/23:    PAP data -set on Min 5, Max 20  Days with usage 27/30 days   Days without device usage 3  Percent days with Device Usage 90 %   Percent of days with Usage greater than  4 hours 87 %   Average usage days used 7 hours 10 minutes     AHI 1.1      Exercise/diet: Seeing bariatrics to have weight loss surgery. Weight today 341 lbs, was 325 lbs 12/2022    Current Outpatient Medications   Medication Sig Dispense Refill    venlafaxine (EFFEXOR XR) 150 MG extended release capsule TAKE 1 CAPSULE BY MOUTH EVERY DAY      dilTIAZem (CARDIZEM CD) 240 MG extended release capsule Take 240 mg by mouth daily      valsartan (DIOVAN) 160 MG tablet TAKE 1 TABLET BY MOUTH EVERY DAY      omeprazole (PRILOSEC) 40 MG delayed release capsule TAKE 1 CAPSULE BY MOUTH EVERY DAY BEFORE A MEAL      Vitamin E 400 units TABS Take by mouth      loratadine (CLARITIN) 10 MG capsule Take  by mouth. No current facility-administered medications for this visit. Allergies   Allergen Reactions    Erythromycin Nausea And Vomiting    Metformin Nausea And Vomiting        Past Medical History:   Diagnosis Date    Acid reflux     Hypertension     MVP (mitral valve prolapse)         Past Surgical History:   Procedure Laterality Date    ADENOIDECTOMY      CHOLECYSTECTOMY      ENDOMETRIAL ABLATION  11/22/2013    DILATATION AND CURETTAGE HYSTEROSCOPY WITH NOVASURE ABLATION    OVARIAN CYST REMOVAL      TONSILLECTOMY      TUBAL LIGATION      UPPER GASTROINTESTINAL ENDOSCOPY N/A 11/23/2022    EGD BIOPSY performed by Keyshawn Carreon MD at [address] [facility] ENDOSCOPY        Review of Systems   Constitutional:  Negative for chills, fatigue and fever. HENT:  Negative for congestion, postnasal drip, rhinorrhea and sore throat. Eyes:  Negative for pain and visual disturbance. Wears glasses   Respiratory:  Negative for cough, chest tightness, shortness of breath and wheezing. Cardiovascular:  Negative for chest pain, palpitations and leg swelling. Gastrointestinal:  Negative for abdominal pain. Genitourinary:  Negative for difficulty urinating. Musculoskeletal: Negative. Skin: Negative. Neurological:  Negative for dizziness, numbness and headaches. Psychiatric/Behavioral:  The patient is not nervous/anxious. BP (!) 155/85 (Site: Left Lower Arm, Position: Sitting, Cuff Size: Medium Adult)   Pulse 86   Temp 98.8 °F (37.1 °C) (Temporal)   Resp 16   Ht 5' 4\" (1.626 m)   Wt (!) 341 lb (154.7 kg)   SpO2 97%   BMI 58.53 kg/m²     Additional Measurements    02/06/23 1329   Neck circumference (Inches): 15       Physical Exam  Vitals reviewed. Constitutional:       General: She is not in acute distress. Appearance: Normal appearance. She is obese. She is not ill-appearing, toxic-appearing or diaphoretic. HENT:      Head: Normocephalic and atraumatic. Nose: Nose normal. No congestion or rhinorrhea. Mouth/Throat:      Mouth: Mucous membranes are moist.      Pharynx: Oropharynx is clear. No oropharyngeal exudate or posterior oropharyngeal erythema. Eyes:      Pupils: Pupils are equal, round, and reactive to light. Cardiovascular:      Rate and Rhythm: Normal rate. Pulmonary:      Effort: Pulmonary effort is normal. No respiratory distress. Breath sounds: Normal breath sounds. No stridor. No wheezing, rhonchi or rales. Chest:      Chest wall: No tenderness. Abdominal:      Palpations: Abdomen is soft. Tenderness: There is no abdominal tenderness. There is no guarding or rebound. Musculoskeletal:         General: Normal range of motion. Cervical back: Neck supple. Right lower leg: No edema. Left lower leg: No edema. Lymphadenopathy:      Cervical: No cervical adenopathy. Skin:     General: Skin is warm and dry. Capillary Refill: Capillary refill takes less than 2 seconds. Neurological:      Mental Status: She is alert and oriented to person, place, and time.    Psychiatric:         Mood and Affect: Mood normal.         Behavior: Behavior normal.         Thought Content: Thought content normal.         Judgment: Judgment normal.        Assessment/Plan:     1. ALEX (obstructive sleep apnea)  2. Morbid obesity with BMI of 50.0-59.9, adult ([address]       Ssuan Burkett has good benefit and adherence on PAP therapy. Compliance report information was analyzed to assess complexity and medical decision making in regards to further testing and management. Will prescribe home medical equipment company to check pressures, download usage and will replace mask, tubing, disposables and filters as needed. May proceed with bariatric surgery from a sleep medicine standpoint. .       Instructed to wash or wipe face of excess oil before using CPAP to prevent the mask / nasal pillow from sliding and ensure proper fit. Empty the water daily and allow the chamber and tubings to air dry. Instructed how to properly clean the device to prevent bacteria and mold growth in the water chamber. Advised to avoid driving if too sleepy to function safely and given a discussion of the risks of untreated apneas such as accidents, cognitive impairment, mood impairment, worsening high blood pressure, various cardiac disease and stroke. Regarding obesity, recommend to try a formal program and/or increase physical activity by adding a 30 minute walk to daily routine. Weight loss was encouraged as a long term approach to treatment of ALEX. Explained the correlation between obesity and apnea and the causative role it can play. Follow up in December or sooner for any issues.       HERON Moran - CNP

## 2023-02-09 ENCOUNTER — OFFICE VISIT (OUTPATIENT)
Dept: BARIATRICS/WEIGHT MGMT | Age: 53
End: 2023-02-09
Payer: COMMERCIAL

## 2023-02-09 VITALS
SYSTOLIC BLOOD PRESSURE: 136 MMHG | WEIGHT: 293 LBS | DIASTOLIC BLOOD PRESSURE: 89 MMHG | RESPIRATION RATE: 18 BRPM | OXYGEN SATURATION: 97 % | HEIGHT: 64 IN | HEART RATE: 91 BPM | BODY MASS INDEX: 50.02 KG/M2

## 2023-02-09 DIAGNOSIS — G47.33 OSA (OBSTRUCTIVE SLEEP APNEA): ICD-10-CM

## 2023-02-09 DIAGNOSIS — E78.2 MIXED HYPERLIPIDEMIA: ICD-10-CM

## 2023-02-09 DIAGNOSIS — E66.01 MORBID OBESITY WITH BMI OF 50.0-59.9, ADULT (HCC): Primary | ICD-10-CM

## 2023-02-09 DIAGNOSIS — R73.03 PREDIABETES: ICD-10-CM

## 2023-02-09 DIAGNOSIS — K21.9 CHRONIC GERD: ICD-10-CM

## 2023-02-09 DIAGNOSIS — I10 ESSENTIAL HYPERTENSION: ICD-10-CM

## 2023-02-09 PROCEDURE — 3079F DIAST BP 80-89 MM HG: CPT | Performed by: SURGERY

## 2023-02-09 PROCEDURE — 3075F SYST BP GE 130 - 139MM HG: CPT | Performed by: SURGERY

## 2023-02-09 PROCEDURE — 99214 OFFICE O/P EST MOD 30 MIN: CPT | Performed by: SURGERY

## 2023-02-09 NOTE — PATIENT INSTRUCTIONS
Patient received dietary handouts and education.     Plan/Recommendations:   - Start tracking with MFP  - Include protein + plants each time you eat  - Measure portions and buy individual sizes  - Work with [facility] Pranav

## 2023-02-09 NOTE — PROGRESS NOTES
Chino Comer gained 17 lbs over 2 month. Pt has been working through financial and insurance changes, endorsed stress and depression. Wake up 5:30 AM  Breakfast: 6:20 AM: Kids Cereal w/ 1%milk, small bowl    Snack: 9 AM: Cheese + crackesr  Lunch: 11 AM: Salad w/ tomato/ chix/ cheese/ fat free judith dressing  Snack: 2-3 PM: 1 slice bread w/ PB OR PB + fruit OR Unjury Shake  Dinner: 5 PM: Casserole w/ chix/noodles/cheese + salaf w/ ff dressing  Snack: None    Is pt eating a lean protein source with all meals and snacks? Smaller plate, endorsed larger portions with depression     Has pt decreased their portions using the plate method? yes she has     Is pt choosing low fat/sugar free options? Was eating zebra cakse for a week     Is pt drinking at least 64 oz of clear liquids everyday? Yes crystal light, water, decaf coffee    Has pt stopped drinking carbonation, caffeinated, and sugar sweetened beverages? yes she has       Has pt sampled Unjury and/or Nectar protein? yes likes the chocolate splendor  and unjury chicken broth      Has pt attended a support group?  Completed    Participating in intentional exercise? no    Plan/Recommendations:   - Start tracking with MFP  - Include protein + plants each time you eat  - Measure portions and buy individual sizes  - Work with behaviorist    Handouts: 1800 kcal PSMP + MFP instructions + SG 23    Vianney Tellez RD, LD

## 2023-02-09 NOTE — PROGRESS NOTES
[facility]) Physicians   [employer] [facility] [employer]  Ernestina Luis MD, [facility], [address], [address] [facility]    [address] [address] . Phone: [phone]  Fax: [phone]          Chief Complaint   Patient presents with    Obesity     5th pre-surg         HPI:     Drew Ramírez is a very pleasant 46 y.o. female with Body mass index is 58.7 kg/m². / Chronic Obesity. Margo Robins has been struggling for several years now with obesity. Margo Robins feels the weight is an obstacle to achieve and perform things in daily living as well risk on health. Patient  is very determined to lose weight and be healthy, and is working towards  surgical weight loss to achieve this goal. Pre-operative clearance and work up pending. Working hard to keep good dietary habits as well level of activity. Patient denies any nausea, vomiting, fevers, chills, shortness of breath, chest pain, cough, constipation or difficulty urinating. Pain Assessment   Denies any abdominal pain       Past Medical History:   Diagnosis Date    Acid reflux     Hypertension     MVP (mitral valve prolapse)      Past Surgical History:   Procedure Laterality Date    ADENOIDECTOMY      CHOLECYSTECTOMY      ENDOMETRIAL ABLATION  11/22/2013    DILATATION AND CURETTAGE HYSTEROSCOPY WITH NOVASURE ABLATION    OVARIAN CYST REMOVAL      TONSILLECTOMY      TUBAL LIGATION      UPPER GASTROINTESTINAL ENDOSCOPY N/A 11/23/2022    EGD BIOPSY performed by Cammie Gamboa MD at [address] [place] [address]     Family History   Problem Relation Age of Onset    Heart Disease Mother     Cancer Mother     Heart Disease Father     Cancer Father     Cancer Sister      Social History     Tobacco Use    Smoking status: Never    Smokeless tobacco: Never   Substance Use Topics    Alcohol use: No     I counseled the patient on the importance of not smoking and risks of ETOH.    Allergies   Allergen Reactions    Erythromycin Nausea And Vomiting    Metformin Nausea And Vomiting Vitals:    02/09/23 1502   BP: 136/89   Pulse: 91   Resp: 18   SpO2: 97%   Weight: (!) 342 lb (155.1 kg)   Height: 5' 4\" (1.626 m)       Body mass index is 58.7 kg/m².     Lab Results   Component Value Date/Time    WBC 7.4 10/06/2022 08:50 AM    RBC 4.75 10/06/2022 08:50 AM    HGB 13.3 10/06/2022 08:50 AM    HCT 39.6 10/06/2022 08:50 AM    MCV 83.4 10/06/2022 08:50 AM    MCH 28.0 10/06/2022 08:50 AM    MCHC 33.5 10/06/2022 08:50 AM    MPV 6.9 10/06/2022 08:50 AM    NEUTOPHILPCT 58.6 10/06/2022 08:50 AM    LYMPHOPCT 34.8 10/06/2022 08:50 AM    MONOPCT 6.2 10/06/2022 08:50 AM    EOSRELPCT 0.0 10/06/2022 08:50 AM    BASOPCT 0.4 10/06/2022 08:50 AM    NEUTROABS 4.4 10/06/2022 08:50 AM    LYMPHSABS 2.6 10/06/2022 08:50 AM    MONOSABS 0.5 10/06/2022 08:50 AM    EOSABS 0.0 10/06/2022 08:50 AM     Lab Results   Component Value Date/Time     10/06/2022 08:50 AM    K 4.6 10/06/2022 08:50 AM     10/06/2022 08:50 AM    CO2 25 10/06/2022 08:50 AM    ANIONGAP 11 10/06/2022 08:50 AM    GLUCOSE 104 10/06/2022 08:50 AM    BUN 16 10/06/2022 08:50 AM    CREATININE 0.9 10/06/2022 08:50 AM    LABGLOM >60 10/06/2022 08:50 AM    GFRAA >60 10/06/2022 08:50 AM    GFRAA >60 08/14/2012 03:36 PM    CALCIUM 9.5 10/06/2022 08:50 AM    PROT 6.9 10/06/2022 08:50 AM    PROT 7.6 08/14/2012 03:36 PM    LABALBU 4.2 10/06/2022 08:50 AM    AGRATIO 1.6 10/06/2022 08:50 AM    BILITOT 0.3 10/06/2022 08:50 AM    ALKPHOS 116 10/06/2022 08:50 AM    ALT 17 10/06/2022 08:50 AM    AST 15 10/06/2022 08:50 AM    GLOB 3.3 03/15/2019 02:06 PM     Lab Results   Component Value Date/Time    CHOL 219 10/06/2022 08:50 AM    TRIG 134 10/06/2022 08:50 AM    HDL 39 10/06/2022 08:50 AM    HDL 43 09/29/2010 05:15 AM    LDLCALC 153 10/06/2022 08:50 AM    LABVLDL 27 10/06/2022 08:50 AM     Lab Results   Component Value Date/Time    TSHREFLEX 2.44 10/06/2022 08:50 AM     Lab Results   Component Value Date/Time    IRON 78 10/06/2022 08:50 AM    TIBC 287 10/06/2022 08:50 AM    LABIRON 27 10/06/2022 08:50 AM     Lab Results   Component Value Date/Time    ABZNGOLO93 499 10/06/2022 08:50 AM    FOLATE 5.51 10/06/2022 08:50 AM     Lab Results   Component Value Date/Time    VITD25 34.7 10/06/2022 08:50 AM     Lab Results   Component Value Date/Time    LABA1C 5.4 10/06/2022 08:50 AM    .3 10/06/2022 08:50 AM         Current Outpatient Medications:     venlafaxine (EFFEXOR XR) 150 MG extended release capsule, TAKE 1 CAPSULE BY MOUTH EVERY DAY, Disp: , Rfl:     dilTIAZem (CARDIZEM CD) 240 MG extended release capsule, Take 240 mg by mouth daily, Disp: , Rfl:     valsartan (DIOVAN) 160 MG tablet, TAKE 1 TABLET BY MOUTH EVERY DAY, Disp: , Rfl:     omeprazole (PRILOSEC) 40 MG delayed release capsule, TAKE 1 CAPSULE BY MOUTH EVERY DAY BEFORE A MEAL, Disp: , Rfl:     Vitamin E 400 units TABS, Take by mouth, Disp: , Rfl:     loratadine (CLARITIN) 10 MG capsule, Take  by mouth., Disp: , Rfl:     Review of Systems - History obtained from the patient  General ROS: negative  Psychological ROS: negative  Ophthalmic ROS: negative  Neurological ROS: negative  ENT ROS: negative  Allergy and Immunology ROS: negative  Hematological and Lymphatic ROS: negative  Endocrine ROS: negative  Breast ROS: negative  Respiratory ROS: negative  Cardiovascular ROS: negative  Gastrointestinal ROS:negative  Genito-Urinary ROS: negative  Musculoskeletal ROS: negative   Skin ROS: negative    Physical Exam   Vitals Reviewed   Constitutional: Patient is oriented to person, place, and time. Patient appears well-developed and well-nourished. Patient is active and cooperative. Non-toxic appearance. No distress. HENT:   Head: Normocephalic and atraumatic. Head is without abrasion and without laceration. Hair is normal.   Right Ear: External ear normal. No lacerations. No drainage, swelling . Left Ear: External ear normal. No lacerations. No drainage, swelling.    Nose/Mouth: face mask in place  Eyes: Conjunctivae, EOM and lids are normal. Right eye exhibits no discharge. No foreign body present in the right eye. Left eye exhibits no discharge. No foreign body present in the left eye. No scleral icterus. Neck: Trachea normal and normal range of motion. No JVD present. Pulmonary/Chest: Effort normal. No accessory muscle usage or stridor. No apnea. No respiratory distress. Cardiovascular: Normal rate and no JVD. Abdominal: Normal appearance. Patient exhibits no distension. Abdomen is soft, obese, non tender. Musculoskeletal: Normal range of motion. Patient exhibits no edema. Neurological: Patient is alert and oriented to person, place, and time. Patient has normal strength. GCS eye subscore is 4. GCS verbal subscore is 5. GCS motor subscore is 6. Skin: Skin is warm and dry. No abrasion and no rash noted. Patient is not diaphoretic. No cyanosis or erythema. Psychiatric: Patient has a normal mood and affect. Speech is normal and behavior is normal. Cognition and memory are normal.       A/P    Eneida Human is 46 y.o. female, Body mass index is 58.7 kg/m². pre surgery, has gained 17 lbs since last visit. The patient underwent extensive dietary counseling with registered dietician. I have reviewed, discussed and agree with the dietary plan. Patient is trying hard to keep good dietary and behavior modifications. Patient is monitoring portion sizes, food choices and liquid calories. Patient is trying to exercise regularly as much as possible. Obesity as a disease is considered a high risk to patients overall health and should therefore be considered a high risk disease state. Advised the patient that not getting there weight under control, that could increase risk of complications/worsening of those conditions on the long-term.  (Goal of weight loss surgery is to alleviate/control some of those co-morbidities)    Now with Covid-19 pandemic, CDC and health authorities does classify obese patients as vulnerable and high risk as well. Which makes weight loss a priority for improvement of their wellbeing and overall health. I encouraged the patient to continue exercise and keeping healthy eating habits. Discussed pre-op labs and work up till now. Also counseled the patient extensively on Surgery. I did explain thoroughly to the patient that compliance with pre- and post op diet and other recommendations are integral part to improve the chances of successful weight loss and also not following it could end with serious health complications. Some strategies discussed today include but not limited to : 30/30/30 minutes rule, food diary, avoid fast food and packing/planing ahead, & increasing exercise. Also stressed to the patient importance of taking the multivitamins as instructed, otherwise risk significant complications. The visit today, included any number of the following: Bariatric Preoperative work up/protocols, review of labs, imaging, provider notes, outside hospital records, performing examination/evaluation, counseling patient and/or family, ordering medications/tests, placing referrals and communication with referring physicians, coordination of care; discussing dietary plan/recall with the patient as well with registered dietitian and documentation in the EHR. Of note, the above was done during same day of the actual patient encounter. Pradeep De La Fuente is here for her fifth presurgical visit. Patient has been under a lot of stress and was off track and gained a lot of weight. Pradeep De La Fuente will benefit from seeing our Abbie Villegas, to work on behavioral aspects / changes to help with weight loss / maintenance. Patient also has new insurance we can have to check it for benefits to see if she has bariatric coverage or not.     We discussed how her excess weight affects her overall health and importance of weight loss, healthy diet and active lifestyle to alleviate those co morbid conditions, otherwise risk deterioration. Morbid Obesity: Body mass index is 58.7 kg/m². [x] Continue to make dietary and lifestyle modifications. [x] Plan for Future laparoscopic sleeve gastrectomy. [x] Return for follow-up next month. Chronic GERD:   [x] Continue to make dietary and lifestyle modifications. [x] Continue Omeprazole. [] Continue Famotidine. [] Plan for EGD to evaluate the stomach. Essential Hypertension:  [x] Continue to make dietary and lifestyle modifications. [x] Reviewed the importance of checking blood pressure. [x] Continue to follow up with their PCP for medication management and monitoring. Prediabetes / Diabetes Mellitus Type II in Obese:   [x] Continue to make dietary and lifestyle modifications. [x] Reviewed the importance of checking blood sugars. [x] Continue to follow up with their PCP for medication management and monitoring. Hyperlipidemia:   [x] Continue to make dietary and lifestyle modifications. [x] Continue to follow up with their PCP for medication management and monitoring. Obstructive Sleep Apnea:   [x] Continue to make dietary and lifestyle modifications. [x] Reviewed the importance of wearing / compliance with CPAP/BIPAP. [x] Continue to follow up with their sleep medicine provider for CPAP/BIPAP management and monitoring. Metabolic Syndrome:  [x] Continue to make dietary and lifestyle modifications. [x] Continue to follow up with their PCP for Medical Management. [x] Continue with weight loss. Patient advised that its their responsibility to follow up for studies, referrals and/or labs ordered today.

## 2023-03-16 ENCOUNTER — OFFICE VISIT (OUTPATIENT)
Dept: BARIATRICS/WEIGHT MGMT | Age: 53
End: 2023-03-16
Payer: COMMERCIAL

## 2023-03-16 VITALS
RESPIRATION RATE: 18 BRPM | SYSTOLIC BLOOD PRESSURE: 128 MMHG | WEIGHT: 293 LBS | HEIGHT: 64 IN | HEART RATE: 84 BPM | BODY MASS INDEX: 50.02 KG/M2 | OXYGEN SATURATION: 99 % | DIASTOLIC BLOOD PRESSURE: 72 MMHG

## 2023-03-16 DIAGNOSIS — K21.9 CHRONIC GERD: ICD-10-CM

## 2023-03-16 DIAGNOSIS — I10 ESSENTIAL HYPERTENSION: ICD-10-CM

## 2023-03-16 DIAGNOSIS — R73.03 PREDIABETES: ICD-10-CM

## 2023-03-16 DIAGNOSIS — G47.33 OSA (OBSTRUCTIVE SLEEP APNEA): ICD-10-CM

## 2023-03-16 DIAGNOSIS — E66.01 MORBID OBESITY WITH BMI OF 50.0-59.9, ADULT (HCC): Primary | ICD-10-CM

## 2023-03-16 DIAGNOSIS — E78.2 MIXED HYPERLIPIDEMIA: ICD-10-CM

## 2023-03-16 PROCEDURE — 3074F SYST BP LT 130 MM HG: CPT | Performed by: SURGERY

## 2023-03-16 PROCEDURE — 3078F DIAST BP <80 MM HG: CPT | Performed by: SURGERY

## 2023-03-16 PROCEDURE — 99214 OFFICE O/P EST MOD 30 MIN: CPT | Performed by: SURGERY

## 2023-03-16 RX ORDER — BUPROPION HYDROCHLORIDE 150 MG/1
TABLET ORAL
COMMUNITY
Start: 2023-03-01

## 2023-03-16 NOTE — PROGRESS NOTES
[facility]) Physicians   [employer] [facility] [employer]  Kelsey Ryder MD, [facility] [address], [address] [facility] [address], [address] [facility]    [facility] [address] . Phone: [phone]  Fax: [phone]        HPI:     Sue Fernández is a very pleasant 46 y.o. female with Body mass index is 58.36 kg/m². , Pre-Surgery for future weight loss. Pre-operative clearance and work up done. Working hard to keep good dietary habits as well level of activity. Patient denies any nausea, vomiting, fevers, chills, shortness of breath, chest pain, cough, constipation or difficulty urinating. Pain Assessment   Denies any abdominal pain       Past Medical History:   Diagnosis Date    Acid reflux     Hypertension     MVP (mitral valve prolapse)      Past Surgical History:   Procedure Laterality Date    ADENOIDECTOMY      CHOLECYSTECTOMY      ENDOMETRIAL ABLATION  11/22/2013    DILATATION AND CURETTAGE HYSTEROSCOPY WITH NOVASURE ABLATION    OVARIAN CYST REMOVAL      TONSILLECTOMY      TUBAL LIGATION      UPPER GASTROINTESTINAL ENDOSCOPY N/A 11/23/2022    EGD BIOPSY performed by Ayleen Laura MD at [address] [place] [address]     Family History   Problem Relation Age of Onset    Heart Disease Mother     Cancer Mother     Heart Disease Father     Cancer Father     Cancer Sister      Social History     Tobacco Use    Smoking status: Never    Smokeless tobacco: Never   Substance Use Topics    Alcohol use: No     I counseled the patient on the importance of not smoking and risks of ETOH. Allergies   Allergen Reactions    Erythromycin Nausea And Vomiting    Metformin Nausea And Vomiting     Vitals:    03/16/23 1434   BP: 128/72   Pulse: 84   Resp: 18   SpO2: 99%   Weight: (!) 340 lb (154.2 kg)   Height: 5' 4\" (1.626 m)       Body mass index is 58.36 kg/m².       Current Outpatient Medications:     buPROPion (WELLBUTRIN XL) 150 MG extended release tablet, TAKE 1 TABLET BY MOUTH EVERY DAY, Disp: , Rfl:     VALSARTAN-HYDROCHLOROTHIAZIDE PO, , Disp: , Rfl: venlafaxine (EFFEXOR XR) 150 MG extended release capsule, TAKE 1 CAPSULE BY MOUTH EVERY DAY, Disp: , Rfl:     dilTIAZem (CARDIZEM CD) 240 MG extended release capsule, Take 240 mg by mouth daily, Disp: , Rfl:     omeprazole (PRILOSEC) 40 MG delayed release capsule, TAKE 1 CAPSULE BY MOUTH EVERY DAY BEFORE A MEAL, Disp: , Rfl:     Vitamin E 400 units TABS, Take by mouth, Disp: , Rfl:     loratadine (CLARITIN) 10 MG capsule, Take  by mouth., Disp: , Rfl:     valsartan (DIOVAN) 160 MG tablet, TAKE 1 TABLET BY MOUTH EVERY DAY, Disp: , Rfl:       Review of Systems - History obtained from the patient  General ROS: negative  Psychological ROS: negative  Ophthalmic ROS: negative  Neurological ROS: negative  ENT ROS: negative  Allergy and Immunology ROS: negative  Hematological and Lymphatic ROS: negative  Endocrine ROS: negative  Breast ROS: negative  Respiratory ROS: negative  Cardiovascular ROS: negative  Gastrointestinal ROS:negative  Genito-Urinary ROS: negative  Musculoskeletal ROS: negative   Skin ROS: negative    Physical Exam   Vitals Reviewed   Constitutional: Patient is oriented to person, place, and time. Patient appears well-developed and well-nourished. Patient is active and cooperative. Non-toxic appearance. No distress. HENT:   Head: Normocephalic and atraumatic. Head is without abrasion and without laceration. Hair is normal.   Right Ear: External ear normal. No lacerations. No drainage, swelling . Left Ear: External ear normal. No lacerations. No drainage, swelling. Mouth/Nose: Mask in Place   Eyes: Conjunctivae, EOM and lids are normal. Right eye exhibits no discharge. No foreign body present in the right eye. Left eye exhibits no discharge. No foreign body present in the left eye. No scleral icterus. Neck: Trachea normal and normal range of motion. No JVD present. Pulmonary/Chest: Effort normal. No accessory muscle usage or stridor. No apnea. No respiratory distress.    Cardiovascular: Normal rate and no JVD. Abdominal: Normal appearance. Patient exhibits no distension. Abdomen is soft, obese, non tender. Musculoskeletal: Normal range of motion. Patient exhibits no edema. Neurological: Patient is alert and oriented to person, place, and time. Patient has normal strength. GCS eye subscore is 4. GCS verbal subscore is 5. GCS motor subscore is 6. Skin: Skin is warm and dry. No abrasion and no rash noted. Patient is not diaphoretic. No cyanosis or erythema. Psychiatric: Patient has a normal mood and affect. Speech is normal and behavior is normal. Cognition and memory are normal.       A/P       Sol Matters is 46 y.o. female, Body mass index is 58.36 kg/m². pre surgery. The patient underwent dietary counseling with registered dietician. I have reviewed, discussed and agree with the dietary plan. Patient is trying hard to keep good dietary and behavior modifications. Patient is monitoring portion sizes, food choices and liquid calories. Patient is trying to exercise regularly as much as possible. We discussed how her weight affects her overall health including:  Patient Active Problem List   Diagnosis    Chest pain    Menorrhagia    Essential hypertension    Chronic GERD    Morbid obesity with BMI of 50.0-59.9, adult ([facility] [address]    Prediabetes    Mixed hyperlipidemia    ALEX (obstructive sleep apnea)    Mitral valve disorder    Acquired hypothyroidism    Diaphragmatic hernia    and importance of weight loss to alleviate those co morbid conditions. I encouraged the patient to continue exercise and keeping healthy eating habits. Discussed pre-op labs and work up till now. Also counseled the patient extensively on Surgery.       The encounter today, included any number of the following: Bariatric Pre/Post operative work up/protocols, review of labs, imaging, provider notes, outside hospital records, performing examination/evaluation, counseling patient and/or family, ordering medications/tests, placing referrals and communication with referring physicians, coordination of care; discussing dietary plan/recall with the patient as well with registered dietitian and documentation in the EHR. Of note, the above was done during same day of the actual patient encounter. I did explain thoroughly to the patient that compliance with pre- and post op diet and other recommendations are integral part to improve the chances of successful weight loss and also not following it could end with serious health complications. Some strategies discussed today include but not limited to : 30/30/30 minutes rule, food diary, avoid fast food and packing/planing ahead, & increasing exercise. Also stressed to the patient importance of taking the multivitamins as instructed, otherwise risk significant complications. Cortney Thompson is a 46 y.o. female with Body mass index is 58.36 kg/m². ,  patient is deemed appropriate candidate for weight loss surgery by our multidisciplinary team.       Following The Metabolic and Bariatric Surgery Accreditation and Quality Improvement Program [facility]), and [employer] (ACS) recommendations, the Bariatric Surgical Risk/Benefit Calculator was used for Cortney Thompson. Report was discussed with Dmitri Figueroa and patient wishes to proceed with surgery, fully understanding the risks and benefits. Of note, The [facility] Bariatric Surgical Risk/Benefit Calculator estimates the chance of an unfavorable outcome (such as a complication or death), the chance of remission of weight-related comorbidities, and the patient's BMI, weight change, and percent total weight change after surgery. These quantities are estimated based upon information the patient gives the healthcare provider about prior health history. The estimates are calculated using data from a large number of patients who had a primary bariatric surgical procedure similar to the one the patient may have.   Please note the risk percentages, remission percentages, BMI, weight change, and percent total weight change provided to you by the risk/benefit calculator are only estimates. These estimates only take certain information into account. There may be other factors that are not included in the estimate which may increase or decrease the risk of a complication, the chance of remission of a weight-related comorbidity, or the amount of weight the patient loses. These estimates are not a guarantee of results. A complication after surgery may happen even if the risk is low, a weight-related comorbidity may not go into remission even if the chances are high, and a patient may lose more or less weight than predicted. This information is not intended to replace the advice of a doctor or healthcare provider about the diagnosis, treatment, or potential outcomes. The Provider is not responsible for medical decisions that may be made by the patient based on the risk/benefit calculator estimates, since these estimates are provided for informational purposes. Patients should always consult their doctor or other health care provider before deciding on a treatment plan.        Both open and laparoscopic approach were explained in details. Benefits and risks explained. Informed consent obtained.   Risks including but not limited to;Infection, bleeding, gastric leak or obstruction, persistent nausea, vomiting, or reflux, injury to surrounding structures, risks of anesthesia, stricture, delayed gastric emptying, staple line leak, incisional hernia, malnutrition, vitamin deficiency, neurological complications, paralysis,  hair loss, and/ or Conversion to Open surgery may be necessary.  Failure to lose or maintain weight loss, Gallstones or Kidney Stones, Deep Venous Thrombosis , pulmonary embolism and / or death.     With obesity and/or Fatty liver , I may elect to perform liver core biopsy to have  Baseline idea on liver pathology if there is  abnormal Liver Functions or if there is hepatomegaly &/or lesion, risks include but not limited to bile leak, liver hematoma or failure to diagnose a condition. I did explain thoroughly to the patient that compliance with pre- and post op diet and other recommendations are integral part to improve the chances of successful weight loss and also not following it could end with serious health complications. We discussed that our goal is to ameliorate the medical problems and not to obtain a specific body mass index. Patient understands the risks and benefits and wishes to proceed with the procedure. Also understands if BMI is lower than 40 without significant co morbid conditions, concerns for risks of surgery being somewhat higher over long run, however patient wants to proceed and fully understands the risks. Clearly BMI over 35 does impose very serious health risks as well chances of losing weight on diet only is very limited and sustaining weight loss is even less, thus surgery is certainly recommended for long term weight loss and better health overall given compliance. Obesity as a disease is considered a high risk to patients overall health and should therefore be considered a high risk disease state. Now with Covid-19 pandemic, CDC and health authorities does classify obese patients as vulnerable and high risk as well. Which makes weight loss a priority for improvement of their wellbeing and overall health. I advised the patient that we can't guarantee final insurance approval.        I advised the patient that sometimes other indicated procedures may arise and the decision will be based on my assessment intraoperatively. Some may include include but not limited to:  [Ventral Hernia, Risks include but not limited to; infection, bleeding, injury to organs or nerves or vessels, PE, DVT, cardiac events, , persistent pain or symptoms, abscess, hernia recurrence or need for further procedures. However that will be determined intra-operatively, if not safe to proceed , then any additional procedures will have to be addressed later as primary goal is bariatric surgery.]      [ Hiatal Hernia, will attempt repair,  risks and benefits including but not limited to; hemothorax, pneumothorax, recurrence, difficulty swallowing, persistent symptoms, reflux and need for medications, esophageal, splenic, lung, heart, bowel, vagus nerve or gastric injuries. However that will be determined intra-operatively, if not safe to proceed, then any additional procedures will have to be addressed later as primary goal is bariatric surgery.]     Tami Franklin understands that there may be a need to perform other urgent or necessary procedures that were unanticipated. Paula Garrison consent to the performance of any additional procedures determined during the original procedure to be in their best interests and where delay might cause additional harm or put patient at risk for additional anesthesia risk for required by a second procedure and that will be determined by the surgeon. Paula Garrison is aware if Weight gain occurs in pre-op period while on pre-operative diet or  non compliant with it , surgery will be canceled. Irvingdamion Scotts understand that in relation to the COVID-19 pandemic and surgical procedures, patient have been given the opportunity to postpone   surgery until a  later date. Paula Garrison have chosen to proceed with surgery knowing the risks associated with COVID-19. Irvingal Franklin was satisfied with the discussion we had and Tami Franklin had no further questions at end of visit and wants to proceed with surgery. 1- Pre-operative work up ordered for you(labs/x-rays/EKG). 2- Stop taking Blood thinners,one week before surgery. 3- F/U with PCP for pre-op Medical Clearance. 4- Plan for Laparoscopic Sleeve, possible other indicated procedures.            Morbid Obesity: Body mass index is 58.36 kg/m². [x] Continue to make dietary and lifestyle modifications. [x] Plan for Future laparoscopic sleeve gastrectomy. [x] Return for follow-up next month. Chronic GERD:   [x] Continue to make dietary and lifestyle modifications. [] Continue Omeprazole. [] Continue Famotidine. [x] Weight loss Recommended. Essential Hypertension:  [x] Continue to make dietary and lifestyle modifications. [x] Reviewed the importance of checking blood pressure. [x] Continue to follow up with their PCP for medication management and monitoring. [x] Weight loss Recommended. Prediabetes / Diabetes Mellitus Type II in Obese:   [x] Continue to make dietary and lifestyle modifications. [x] Reviewed the importance of checking blood sugars. [x] Continue to follow up with their PCP for medication management and monitoring. Hyperlipidemia:   [x] Continue to make dietary and lifestyle modifications. [x] Continue to follow up with their PCP for medication management and monitoring. [x] Weight loss Recommended. Obstructive Sleep Apnea:   [x] Continue to make dietary and lifestyle modifications. [x] Reviewed the importance of wearing / compliance with CPAP/BIPAP. [x] Continue to follow up with their sleep medicine provider for CPAP/BIPAP management and monitoring. [x] Weight loss Recommended. Metabolic Syndrome:   [x] Continue to make dietary and lifestyle modifications. [x] Continue to follow up with their PCP for Medical Management. [x] Weight loss Recommended. Please note that some or all of this report was generated using voice recognition software. Please notify me in case of any questions about the content of this document, as some errors in transcription may have occurred .

## 2023-03-16 NOTE — PROGRESS NOTES
Carlita Comer lost 2 lbs over 1 month. Pt reports starting new HTN medication affecting knee and back pain. Also started Wellbutrin for depression. Wake up 5:30 AM  Breakfast: 6 AM: Oatmeal + orange + 8 oz 1% milk   Snack: 9 AM: Apple OR Unjury shake (uses 1/day)  Lunch: 11 AM: 1/2 bagel w/ lf cream cheese + salad w/ sliced turkey/jennifer/ lf dressing  Snack: 2-3 PM: 1 /2 slice bread w/ PB or turkey OR Unjury shake   Dinner: 5 PM: Pork  + stuffing + green beans   Snack: None    Is pt eating a lean protein source with all meals and snacks? Smaller plate, endorsed larger portions with depression     Has pt decreased their portions using the plate method? yes she has     Is pt choosing low fat/sugar free options? Avoiding     Is pt drinking at least 64 oz of clear liquids everyday? 80 oz crystal light, water, decaf coffee    Has pt stopped drinking carbonation, caffeinated, and sugar sweetened beverages? yes she has       Has pt sampled Unjury and/or Nectar protein? yes likes the chocolate splendor  and unjury chicken broth      Has pt attended a support group? Completed    Participating in intentional exercise?  Limited d/t back and knee pain     Plan/Recommendations:   - Continue current eating patterns including protein and plants with all meals and snacks  - Aim for 15-30 minutes of walking/stretching daily as tolerated     Handouts: None    Vianney Tellez RD, [place]

## 2023-03-16 NOTE — PATIENT INSTRUCTIONS
Patient received dietary handouts and education.     Plan/Recommendations:   - Continue current eating patterns including protein and plants with all meals and snacks  - Aim for 15-30 minutes of walking/stretching daily as tolerated

## 2023-03-23 NOTE — PROGRESS NOTES
Patient Name:   Dmitry Helm      Type of Surgery:  Sleeve         Date of Surgery:  4/24/23       Start Pre-Op Diet On:  4/11/23       Start Clear Liquids On:  4/23/23         NPO after midnight the night before your surgery! Take morning medications with a small amount of water.     NOTES:_______________________________________  ______________________________________________

## 2023-08-09 ENCOUNTER — TELEPHONE (OUTPATIENT)
Dept: PULMONOLOGY | Age: 53
End: 2023-08-09

## 2023-08-09 NOTE — TELEPHONE ENCOUNTER
Patient called in and wanted to know if her surgery clearance can be extended   Her clearance is valid for 6 months and informed her she will need another clearance appointment   Then she d/c the call

## (undated) DEVICE — MOUTHPIECE ENDOSCP L CTRL OPN AND SIDE PORTS DISP

## (undated) DEVICE — SOLUTION IV IRRIG WATER 500ML POUR BRL ST 2F7113

## (undated) DEVICE — FORCEPS BX L240CM WRK CHN 2.8MM STD CAP W/ NDL MIC MESH

## (undated) DEVICE — VALVE SUCTION AIR H2O SET ORCA POD + DISP

## (undated) DEVICE — ENDOSCOPIC KIT 6X3/16 FT COLON W/ 1.1 OZ 2 GWN W/O BRSH

## (undated) DEVICE — AIR/WATER CLEANING ADAPTER FOR OLYMPUS® GI ENDOSCOPE: Brand: BULLDOG®

## (undated) DEVICE — BW-412T DISP COMBO CLEANING BRUSH: Brand: SINGLE USE COMBINATION CLEANING BRUSH